# Patient Record
Sex: MALE | Race: BLACK OR AFRICAN AMERICAN | Employment: UNEMPLOYED | URBAN - METROPOLITAN AREA
[De-identification: names, ages, dates, MRNs, and addresses within clinical notes are randomized per-mention and may not be internally consistent; named-entity substitution may affect disease eponyms.]

---

## 2017-01-03 ENCOUNTER — CARE COORDINATION (OUTPATIENT)
Dept: GERIATRIC MEDICINE | Facility: CLINIC | Age: 77
End: 2017-01-03

## 2017-01-03 NOTE — PROGRESS NOTES
Home visit/Ward Risk Assessment/EW screening completed on: 01/03//17  Member resides: Apartment handicap accessible. Member continues to live in a one bedroom public housing apartment complex. Present at the visit were member and CM.  Apartment was clean and uncluttered. No recent hospitalization or ED visits. Had a recent cataract removal of the right eye. Last PCP visit on 12/1/16.  Membercontinues to need assistance with ADLs due to right arm and leg paralysis.    Member currently receiving the following services: PCA. Had decrease in PCAhours this year. Informed member of his right to appeal the reduction.   See EMR for a list of client's diagnoses and medications.   Medication management: Medications reviewed:Yes. Medication management: Independent-does not set up. Medication understanding:Patient has understanding of regimen and is adherent Yes. MTM offered.Yes. Declined.  Member Mood/behavior-PHQ9 score:  0. Denies feeling depressor or change in mood.   Plan of Care: No services recommended or requested.  Follow-Up Plan: Member informed of future contact, plan to f/u with member with a 6 month telephone assessment.  Contact information shared with member and family, encouraged member to call with any questions or concerns prior to this.  See [facility] for further detailed information  Rafiq Davila RN BSN N  [facility]   [phone]  Fax: [phone]

## 2017-01-05 ENCOUNTER — CARE COORDINATION (OUTPATIENT)
Dept: GERIATRIC MEDICINE | Facility: CLINIC | Age: 77
End: 2017-01-05

## 2017-01-05 NOTE — PROGRESS NOTES
Dr. Feliciano,    I am the [facility] case manager for this client, and I am writing to notify you of a change in services.   PCA services have been reduced from 6 hrs/day to 4 hrs/day.    This change has occurred because client no longer qualifies for 6 hrs/day based on the reported ADLs dependencies.  It was determined that 4 hrs/day is sufficient to  meet clients needs. Client is aware of his right to appeal the decision within 30 days.    I am required by the health plan to notify you of this change. No action is required on your part. Please do not hesitate to contact me with any questions or concerns. Thank you.    Rafiq Davila RN BSN PHN  [facility]   [phone]  Fax: [phone]

## 2017-01-06 ENCOUNTER — CARE COORDINATION (OUTPATIENT)
Dept: GERIATRIC MEDICINE | Facility: CLINIC | Age: 77
End: 2017-01-06

## 2017-01-06 NOTE — PROGRESS NOTES
Emailed completed PCA assessment to [facility].  Faxed copy of PCA assessment to [facility] and mailed copy to member.  Faxed MD Communication to PCP.   Helen Hernandez  Case Management Specialist  [facility]  [phone]

## 2017-01-21 ENCOUNTER — CARE COORDINATION (OUTPATIENT)
Dept: GERIATRIC MEDICINE | Facility: CLINIC | Age: 77
End: 2017-01-21

## 2017-01-21 NOTE — PROGRESS NOTES
Mailed copy of care plan to client.  As requested/needed:  emailed CPS to [employer] for [facility], updated services in access as needed and submitted appropriate referrals/auths, and entered MMIS. Chart was returned to LUIZA Hernandez  Case Management Specialist  [facility]  [phone]

## 2017-08-09 ENCOUNTER — CARE COORDINATION (OUTPATIENT)
Dept: GERIATRIC MEDICINE | Facility: CLINIC | Age: 77
End: 2017-08-09

## 2017-08-09 NOTE — PROGRESS NOTES
Called client for the 6 month telephone assessment, call went to . Left  requesting call back.    Rafiq Davila RN BSN N  [facility]   [phone]  Fax: [phone]

## 2017-08-10 NOTE — PROGRESS NOTES
Client returned call. See below.    [facility] Six-Month Telephone Assessment    6 month telephone assessment completed on 08/10/17    ER visits: No  Hospitalizations: No  TCU stays: No  Significant health status changes: No  Falls/Injuries: No  ADL/IADL changes: No  Changes in services: No    Caregiver Assessment follow up:  N/A. Has paid caregiver.     Goals: See POC in chart for goal progress documentation.     Will see client in 6 months for an annual health risk assessment.   Encouraged client to call CM with any questions or concerns in the meantime.     Rafiq Davila RN BSN PHN  [facility]   [phone]  Fax: [phone]

## 2017-08-11 ENCOUNTER — OFFICE VISIT (OUTPATIENT)
Dept: FAMILY MEDICINE | Facility: CLINIC | Age: 77
End: 2017-08-11
Payer: MEDICARE

## 2017-08-11 VITALS
SYSTOLIC BLOOD PRESSURE: 124 MMHG | HEIGHT: 67 IN | DIASTOLIC BLOOD PRESSURE: 60 MMHG | HEART RATE: 70 BPM | OXYGEN SATURATION: 97 % | BODY MASS INDEX: 22.29 KG/M2 | TEMPERATURE: 97.9 F | RESPIRATION RATE: 16 BRPM | WEIGHT: 142 LBS

## 2017-08-11 DIAGNOSIS — Z13.1 SCREENING FOR DIABETES MELLITUS: ICD-10-CM

## 2017-08-11 DIAGNOSIS — G83.20: ICD-10-CM

## 2017-08-11 DIAGNOSIS — R20.0 NUMBNESS IN FEET: ICD-10-CM

## 2017-08-11 DIAGNOSIS — I10 HYPERTENSION GOAL BP (BLOOD PRESSURE) < 140/90: Primary | ICD-10-CM

## 2017-08-11 DIAGNOSIS — K21.9 GASTROESOPHAGEAL REFLUX DISEASE WITHOUT ESOPHAGITIS: ICD-10-CM

## 2017-08-11 DIAGNOSIS — S06.9X0S TBI (TRAUMATIC BRAIN INJURY), WITHOUT LOC, SEQUELA: ICD-10-CM

## 2017-08-11 DIAGNOSIS — D21.9: ICD-10-CM

## 2017-08-11 DIAGNOSIS — G47.00 INSOMNIA, UNSPECIFIED TYPE: ICD-10-CM

## 2017-08-11 PROCEDURE — 99214 OFFICE O/P EST MOD 30 MIN: CPT | Performed by: FAMILY MEDICINE

## 2017-08-11 PROCEDURE — 82947 ASSAY GLUCOSE BLOOD QUANT: CPT | Performed by: FAMILY MEDICINE

## 2017-08-11 PROCEDURE — 36415 COLL VENOUS BLD VENIPUNCTURE: CPT | Performed by: FAMILY MEDICINE

## 2017-08-11 RX ORDER — TRAZODONE HYDROCHLORIDE 50 MG/1
100 TABLET, FILM COATED ORAL
Qty: 60 TABLET | Refills: 11 | Status: SHIPPED | OUTPATIENT
Start: 2017-08-11 | End: 2018-05-01

## 2017-08-11 NOTE — NURSING NOTE
"Chief Complaint   Patient presents with     Blood Draw     WANTS LAB TESTS       Initial /64  Pulse 70  Temp 97.9  F (36.6  C) (Tympanic)  Resp 16  Ht 5' 6.5\" (1.689 m)  Wt 142 lb (64.4 kg)  SpO2 97%  BMI 22.58 kg/m2 Estimated body mass index is 22.58 kg/(m^2) as calculated from the following:    Height as of this encounter: 5' 6.5\" (1.689 m).    Weight as of this encounter: 142 lb (64.4 kg).  Medication Reconciliation: complete   Caitlin Knapp CMA    "
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
## 2017-08-11 NOTE — LETTER
Lazaro Paulson  [address]   [facility] [address]        August 14, 2017          Dear ,    We are writing to inform you of your test results.    NORMAL GLUCOSE TEST    Resulted Orders   Glucose   Result Value Ref Range    Glucose 90 70 - 99 mg/dL       If you have any questions or concerns, please call the clinic at the number listed above.       Sincerely,        LALI HART MD
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
## 2017-08-12 LAB — GLUCOSE SERPL-MCNC: 90 MG/DL (ref 70–99)

## 2017-08-18 DIAGNOSIS — I10 ESSENTIAL HYPERTENSION WITH GOAL BLOOD PRESSURE LESS THAN 140/90: ICD-10-CM

## 2017-08-18 DIAGNOSIS — K21.9 GASTROESOPHAGEAL REFLUX DISEASE WITHOUT ESOPHAGITIS: ICD-10-CM

## 2017-08-18 NOTE — TELEPHONE ENCOUNTER
Hydrochlorothiazide  - Discontinued  Last Written Prescription Date: 8/25/15  Last Fill Quantity: 90, # refills: 3  Last Office Visit with [facility], [facility] or [facility] prescribing provider: 8/11/17       Potassium   Date Value Ref Range Status   12/01/2016 3.9 3.4 - 5.3 mmol/L Final     Creatinine   Date Value Ref Range Status   12/01/2016 1.24 0.66 - 1.25 mg/dL Final     BP Readings from Last 3 Encounters:   08/11/17 124/60   12/01/16 148/78   05/31/16 160/78     Aspir-Low      Last Written Prescription Date: 5/23/16  Last Fill Quantity: 100,  # refills: 3   Last Office Visit with [facility], [facility] or [facility] prescribing provider: 8/11/17      Omeprazole      Last Written Prescription Date: 5/23/16  Last Fill Quantity: 30,  # refills: 12   Last Office Visit with [facility], [facility] or [facility] prescribing provider: 8/11/17

## 2017-08-21 RX ORDER — NICOTINE POLACRILEX 4 MG/1
20 GUM, CHEWING ORAL DAILY
Qty: 90 TABLET | Refills: 3 | Status: SHIPPED | OUTPATIENT
Start: 2017-08-21 | End: 2018-05-01

## 2017-08-21 RX ORDER — HYDROCHLOROTHIAZIDE 12.5 MG/1
12.5 TABLET ORAL DAILY
Qty: 30 TABLET | Refills: 5 | Status: SHIPPED | OUTPATIENT
Start: 2017-08-21 | End: 2018-02-18

## 2017-08-21 NOTE — TELEPHONE ENCOUNTER
Routing refill request to provider for review/approval because:  Note in med hx that patient stopped the HCTZ

## 2017-08-23 DIAGNOSIS — I10 ESSENTIAL HYPERTENSION WITH GOAL BLOOD PRESSURE LESS THAN 140/90: ICD-10-CM

## 2017-08-23 RX ORDER — ATENOLOL 50 MG/1
50 TABLET ORAL DAILY
Qty: 30 TABLET | Refills: 11 | Status: SHIPPED | OUTPATIENT
Start: 2017-08-23 | End: 2018-05-01

## 2017-08-23 NOTE — TELEPHONE ENCOUNTER
atenolol      Last Written Prescription Date: 5-23-16  Last Fill Quantity: 30, # refills: 11    Last Office Visit with [facility], [facility] or [facility] prescribing provider:  8-11-17   Future Office Visit:        BP Readings from Last 3 Encounters:   08/11/17 124/60   12/01/16 148/78   05/31/16 160/78     Prescription approved per [facility] Refill Protocol.

## 2017-09-13 ENCOUNTER — OFFICE VISIT (OUTPATIENT)
Dept: FAMILY MEDICINE | Facility: CLINIC | Age: 77
End: 2017-09-13
Payer: MEDICARE

## 2017-09-13 VITALS
HEART RATE: 78 BPM | BODY MASS INDEX: 22.81 KG/M2 | WEIGHT: 143.5 LBS | TEMPERATURE: 98.6 F | DIASTOLIC BLOOD PRESSURE: 72 MMHG | SYSTOLIC BLOOD PRESSURE: 140 MMHG | OXYGEN SATURATION: 98 % | RESPIRATION RATE: 20 BRPM

## 2017-09-13 DIAGNOSIS — G83.20: ICD-10-CM

## 2017-09-13 DIAGNOSIS — M54.50 CHRONIC MIDLINE LOW BACK PAIN WITHOUT SCIATICA: ICD-10-CM

## 2017-09-13 DIAGNOSIS — R20.9 DISTURBANCE OF SKIN SENSATION: Primary | ICD-10-CM

## 2017-09-13 DIAGNOSIS — G89.29 CHRONIC MIDLINE LOW BACK PAIN WITHOUT SCIATICA: ICD-10-CM

## 2017-09-13 LAB
CRP SERPL-MCNC: 3.5 MG/L (ref 0–8)
ERYTHROCYTE [SEDIMENTATION RATE] IN BLOOD BY WESTERGREN METHOD: 7 MM/H (ref 0–20)
FOLATE SERPL-MCNC: 12.7 NG/ML
VIT B12 SERPL-MCNC: 495 PG/ML (ref 193–986)

## 2017-09-13 PROCEDURE — 99214 OFFICE O/P EST MOD 30 MIN: CPT | Performed by: FAMILY MEDICINE

## 2017-09-13 PROCEDURE — 84443 ASSAY THYROID STIM HORMONE: CPT | Performed by: FAMILY MEDICINE

## 2017-09-13 PROCEDURE — 82746 ASSAY OF FOLIC ACID SERUM: CPT | Performed by: FAMILY MEDICINE

## 2017-09-13 PROCEDURE — 82607 VITAMIN B-12: CPT | Performed by: FAMILY MEDICINE

## 2017-09-13 PROCEDURE — 80053 COMPREHEN METABOLIC PANEL: CPT | Performed by: FAMILY MEDICINE

## 2017-09-13 PROCEDURE — 85652 RBC SED RATE AUTOMATED: CPT | Performed by: FAMILY MEDICINE

## 2017-09-13 PROCEDURE — 86140 C-REACTIVE PROTEIN: CPT | Performed by: FAMILY MEDICINE

## 2017-09-13 PROCEDURE — 36415 COLL VENOUS BLD VENIPUNCTURE: CPT | Performed by: FAMILY MEDICINE

## 2017-09-13 NOTE — MR AVS SNAPSHOT
After Visit Summary   9/13/2017    Lazaro Paulson    MRN: [id number]           Patient Information     Date Of Birth          1940        Visit Information        Provider Department      9/13/2017 2:30 PM Nacho Peres MD; [employer] [facility]        Today's Diagnoses     Disturbance of skin sensation    -  1    Chronic midline low back pain without sciatica          Care Instructions    Continue present medications          Follow-ups after your visit        Your next 10 appointments already scheduled     Sep 19, 2017  3:45 PM CDT   [facility]    [address]  [address]  [place] [address] [phone]           Bring a current list of meds and any records pertaining to this visit. For Physicals, please bring immunization records and any forms needing to be filled out. Please arrive 10 minutes early to complete paperwork.              Who to contact     If you have questions or need follow up information about today's clinic visit or your schedule please contact [facility] directly at [phone].  Normal or non-critical lab and imaging results will be communicated to you by [employer], letter or phone within 4 business days after the clinic has received the results. If you do not hear from us within 7 days, please contact the clinic through [employer] or phone. If you have a critical or abnormal lab result, we will notify you by phone as soon as possible.  Submit refill requests through [employer] or call your pharmacy and they will forward the refill request to us. Please allow 3 business days for your refill to be completed.          Additional Information About Your Visit        [employer] Information     [employer] lets you send messages to your doctor, view your test  "results, renew your prescriptions, schedule appointments and more. To sign up, go to [email or web address] . Click on \"Log in\" on the left side of the screen, which will take you to the Welcome page. Then click on \"Sign up Now\" on the right side of the page.     You will be asked to enter the access code listed below, as well as some personal information. Please follow the directions to create your username and password.     Your access code is: [email or web address]  Expires: 2017  2:42 PM     Your access code will  in 90 days. If you need help or a new code, please call your [place] clinic or [phone].        Care EveryWhere ID     This is your Care EveryWhere ID. This could be used by other organizations to access your [place] medical records  [id number]        Your Vitals Were     Pulse Temperature Respirations Pulse Oximetry BMI (Body Mass Index)       78 98.6  F (37  C) (Skin) 20 98% 22.81 kg/m2        Blood Pressure from Last 3 Encounters:   17 140/72   17 124/60   16 148/78    Weight from Last 3 Encounters:   17 143 lb 8 oz (65.1 kg)   17 142 lb (64.4 kg)   16 149 lb 12.8 oz (67.9 kg)              We Performed the Following     Comprehensive metabolic panel (BMP + Alb, Alk Phos, ALT, AST, Total. Bili, TP)     CRP, inflammation     ESR: Erythrocyte sedimentation rate     Folate     TSH with free T4 reflex     Vitamin B12        Primary Care Provider Office Phone # Fax #    Asif Nelli Feliciano [phone] [address] [facility] [address]        Equal Access to Services     ARABELLA SOSA : Alia Stafford, geetha castellanos, brittaine walton, anjelica mcmillan. So [facility] [phone].    ATENCIÓN: Si habla español, tiene a barrios disposición servicios gratuitos de asistencia lingüística. Llame al [phone].    We comply with applicable federal civil rights laws and [place] laws. We do not " discriminate on the basis of race, color, national origin, age, disability sex, sexual orientation or gender identity.            Thank you!     Thank you for choosing [facility]  for your care. Our goal is always to provide you with excellent care. Hearing back from our patients is one way we can continue to improve our services. Please take a few minutes to complete the written survey that you may receive in the mail after your visit with us. Thank you!             Your Updated Medication List - Protect others around you: Learn how to safely use, store and throw away your medicines at www.disposemymeds.org.          This list is accurate as of: 9/13/17  3:06 PM.  Always use your most recent med list.                   Brand Name Dispense Instructions for use Diagnosis    aspirin 81 MG tablet     100 tablet    Take 1 tablet (81 mg) by mouth daily as needed    Essential hypertension with goal blood pressure less than 140/90       atenolol 50 MG tablet    TENORMIN    30 tablet    Take 1 tablet (50 mg) by mouth daily    Essential hypertension with goal blood pressure less than 140/90       cholecalciferol 1000 UNIT tablet    vitamin D    150 tablet    Take 2 tablets (2,000 Units) by mouth daily    Vitamin D deficiency disease       hydrochlorothiazide 12.5 MG Tabs tablet     30 tablet    Take 1 tablet (12.5 mg) by mouth daily    Essential hypertension with goal blood pressure less than 140/90       omeprazole 20 MG tablet     90 tablet    Take 1 tablet (20 mg) by mouth daily    Gastroesophageal reflux disease without esophagitis       order for DME     1 each    Equipment being ordered: smygmomanometer    Hypertension goal BP (blood pressure) < 140/90       traZODone 50 MG tablet    DESYREL    60 tablet    Take 2 tablets (100 mg) by mouth nightly as needed for sleep    Insomnia, unspecified type
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
## 2017-09-13 NOTE — LETTER
September 15, 2017      Lazaro Paulson  [address]   [place] [facility] [address]        Dear ,    We are writing to inform you of your test results.    Thyroid test (TSH) is normal  Metabolic panel is normal  Vitamin B12 and Folate levels are normal  CRP inflammation test is normal, no evidence for inflammation    All of the lab tests are normal    Resulted Orders   Comprehensive metabolic panel (BMP + Alb, Alk Phos, ALT, AST, Total. Bili, TP)   Result Value Ref Range    Sodium 140 133 - 144 mmol/L    Potassium 3.6 3.4 - 5.3 mmol/L    Chloride 108 94 - 109 mmol/L    Carbon Dioxide 26 20 - 32 mmol/L    Anion Gap 6 3 - 14 mmol/L    Glucose 89 70 - 99 mg/dL    Urea Nitrogen 9 7 - 30 mg/dL    Creatinine 0.93 0.66 - 1.25 mg/dL    GFR Estimate 79 >60 mL/min/1.7m2      Comment:      Non  GFR Calc    GFR Estimate If Black >90 >60 mL/min/1.7m2      Comment:       GFR Calc    Calcium 8.8 8.5 - 10.1 mg/dL    Bilirubin Total 0.4 0.2 - 1.3 mg/dL    Albumin 3.7 3.4 - 5.0 g/dL    Protein Total 7.0 6.8 - 8.8 g/dL    Alkaline Phosphatase 76 40 - 150 U/L    ALT 24 0 - 70 U/L    AST 18 0 - 45 U/L   ESR: Erythrocyte sedimentation rate   Result Value Ref Range    Sed Rate 7 0 - 20 mm/h   CRP, inflammation   Result Value Ref Range    CRP Inflammation 3.5 0.0 - 8.0 mg/L   Vitamin B12   Result Value Ref Range    Vitamin B12 495 193 - 986 pg/mL   Folate   Result Value Ref Range    Folate 12.7 >5.4 ng/mL   TSH with free T4 reflex   Result Value Ref Range    TSH 1.35 0.40 - 4.00 mU/L       If you have any questions or concerns, please call the clinic at the number listed above.       Sincerely,        Nacho Peres MD

## 2017-09-13 NOTE — NURSING NOTE
"Chief Complaint   Patient presents with     Musculoskeletal Problem       Initial /72 (BP Location: Left arm, Patient Position: Chair, Cuff Size: Adult Regular)  Pulse 78  Temp 98.6  F (37  C) (Skin)  Resp 20  Wt 143 lb 8 oz (65.1 kg)  SpO2 98%  BMI 22.81 kg/m2 Estimated body mass index is 22.81 kg/(m^2) as calculated from the following:    Height as of 8/11/17: 5' 6.5\" (1.689 m).    Weight as of this encounter: 143 lb 8 oz (65.1 kg).  Medication Reconciliation: complete     Princess KATIE Shepherd CMA      "

## 2017-09-14 LAB
ALBUMIN SERPL-MCNC: 3.7 G/DL (ref 3.4–5)
ALP SERPL-CCNC: 76 U/L (ref 40–150)
ALT SERPL W P-5'-P-CCNC: 24 U/L (ref 0–70)
ANION GAP SERPL CALCULATED.3IONS-SCNC: 6 MMOL/L (ref 3–14)
AST SERPL W P-5'-P-CCNC: 18 U/L (ref 0–45)
BILIRUB SERPL-MCNC: 0.4 MG/DL (ref 0.2–1.3)
BUN SERPL-MCNC: 9 MG/DL (ref 7–30)
CALCIUM SERPL-MCNC: 8.8 MG/DL (ref 8.5–10.1)
CHLORIDE SERPL-SCNC: 108 MMOL/L (ref 94–109)
CO2 SERPL-SCNC: 26 MMOL/L (ref 20–32)
CREAT SERPL-MCNC: 0.93 MG/DL (ref 0.66–1.25)
GFR SERPL CREATININE-BSD FRML MDRD: 79 ML/MIN/1.7M2
GLUCOSE SERPL-MCNC: 89 MG/DL (ref 70–99)
POTASSIUM SERPL-SCNC: 3.6 MMOL/L (ref 3.4–5.3)
PROT SERPL-MCNC: 7 G/DL (ref 6.8–8.8)
SODIUM SERPL-SCNC: 140 MMOL/L (ref 133–144)
TSH SERPL DL<=0.005 MIU/L-ACNC: 1.35 MU/L (ref 0.4–4)

## 2017-09-19 ENCOUNTER — OFFICE VISIT (OUTPATIENT)
Dept: FAMILY MEDICINE | Facility: CLINIC | Age: 77
End: 2017-09-19
Payer: MEDICARE

## 2017-09-19 VITALS
TEMPERATURE: 98.1 F | OXYGEN SATURATION: 99 % | SYSTOLIC BLOOD PRESSURE: 148 MMHG | BODY MASS INDEX: 23.21 KG/M2 | DIASTOLIC BLOOD PRESSURE: 84 MMHG | RESPIRATION RATE: 16 BRPM | WEIGHT: 146 LBS | HEART RATE: 82 BPM

## 2017-09-19 DIAGNOSIS — G62.9 PERIPHERAL POLYNEUROPATHY: Primary | ICD-10-CM

## 2017-09-19 PROCEDURE — 99213 OFFICE O/P EST LOW 20 MIN: CPT | Performed by: FAMILY MEDICINE

## 2017-09-19 RX ORDER — GABAPENTIN 100 MG/1
100 CAPSULE ORAL 3 TIMES DAILY
Qty: 60 CAPSULE | Refills: 3 | Status: SHIPPED | OUTPATIENT
Start: 2017-09-19 | End: 2018-05-01
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
## 2017-09-19 NOTE — NURSING NOTE
"Chief Complaint   Patient presents with     Lab Result Notice       Initial /84  Pulse 82  Temp 98.1  F (36.7  C) (Tympanic)  Resp 16  Wt 146 lb (66.2 kg)  SpO2 99%  BMI 23.21 kg/m2 Estimated body mass index is 23.21 kg/(m^2) as calculated from the following:    Height as of 8/11/17: 5' 6.5\" (1.689 m).    Weight as of this encounter: 146 lb (66.2 kg).  Medication Reconciliation: complete   Caitlin Knapp CMA      "
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
## 2017-12-20 ENCOUNTER — CARE COORDINATION (OUTPATIENT)
Dept: GERIATRIC MEDICINE | Facility: CLINIC | Age: 77
End: 2017-12-20

## 2017-12-20 NOTE — PROGRESS NOTES
Called client to schedule annual home visit but was unable to reach him. Left vm requesting to return call.    Rafiq Davila RN BSN N  [facility]   [phone]  Fax: [phone]

## 2017-12-28 ENCOUNTER — CARE COORDINATION (OUTPATIENT)
Dept: GERIATRIC MEDICINE | Facility: CLINIC | Age: 77
End: 2017-12-28

## 2017-12-28 ASSESSMENT — PATIENT HEALTH QUESTIONNAIRE - PHQ9: SUM OF ALL RESPONSES TO PHQ QUESTIONS 1-9: 8
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
## 2018-01-02 ENCOUNTER — CARE COORDINATION (OUTPATIENT)
Dept: GERIATRIC MEDICINE | Facility: CLINIC | Age: 78
End: 2018-01-02

## 2018-01-02 NOTE — PROGRESS NOTES
[facility]:  Emailed completed PCA assessment to [facility].  Faxed copy of PCA assessment to PCA Agency and mailed copy to member.  Faxed MD Communication to PCP.   Helen Hernandez  Case Management Specialist  [facility]  [phone]

## 2018-01-03 DIAGNOSIS — E55.9 VITAMIN D DEFICIENCY DISEASE: ICD-10-CM

## 2018-01-03 NOTE — TELEPHONE ENCOUNTER
Reason for Call:  Medication or medication refill:    Do you use a [facility]?  Name of the pharmacy and phone number for the current request:  [facility] pharmacy    Name of the medication requested: cholecalciferol (VITAMIN D) 1000 UNIT tablet     Vitamin D3    Other request:  Please order    Can we leave a detailed message on this number? YES    Phone number patient can be reached at: Home number on file [phone] (home)    Best Time:  anytime    Call taken on 1/3/2018 at 3:56 PM by LEFTY MEDINA

## 2018-01-08 NOTE — PROGRESS NOTES
Rec'd email from [facility] stating PCA agency changed and requested agency information on PCA assessment be updated to reflect the change.  Change was made and assessment was sent back to [facility].  CC notified.  Helen Hernandez  Case Management Specialist  [facility]  [phone]

## 2018-01-08 NOTE — TELEPHONE ENCOUNTER
"Requested Prescriptions   Pending Prescriptions Disp Refills     cholecalciferol (VITAMIN D3) 1000 UNIT tablet  Last Written Prescription Date:  5/23/2016  Last Fill Quantity: 150 tablet,  # refills: 3   Last Office Visit with G, P or [facility] prescribing provider:  9/19/2017   Future Office Visit:    150 tablet 3     Sig: Take 2 tablets (2,000 Units) by mouth daily    Vitamin Supplements (Adult) Protocol Passed    1/3/2018  3:57 PM       Passed - High dose Vitamin D not ordered       Passed - Recent or future visit with authorizing provider's specialty    Patient had office visit in the last year or has a visit in the next 30 days with authorizing provider.  See \"Choose Columns\" in Meds & Orders section of the refill encounter.                 "

## 2018-01-10 ENCOUNTER — CARE COORDINATION (OUTPATIENT)
Dept: GERIATRIC MEDICINE | Facility: CLINIC | Age: 78
End: 2018-01-10

## 2018-01-10 NOTE — PROGRESS NOTES
Received after visit chart from care coordinator.  Completed following tasks: Mailed copy of care plan to client and Entered MMIS  Chart was returned to CC.   Helen Hernandez  Case Management Specialist  [facility]  [phone]

## 2018-02-18 DIAGNOSIS — I10 ESSENTIAL HYPERTENSION WITH GOAL BLOOD PRESSURE LESS THAN 140/90: ICD-10-CM

## 2018-02-19 RX ORDER — HYDROCHLOROTHIAZIDE 12.5 MG/1
TABLET ORAL
Qty: 30 TABLET | Refills: 0 | Status: SHIPPED | OUTPATIENT
Start: 2018-02-19 | End: 2018-03-20

## 2018-02-19 NOTE — TELEPHONE ENCOUNTER
Medication is being filled for 1 time refill only due to:  Patient need follow up appt for more refills.

## 2018-02-19 NOTE — TELEPHONE ENCOUNTER
"Requested Prescriptions   Pending Prescriptions Disp Refills     hydrochlorothiazide 12.5 MG TABS tablet [Pharmacy Med Name: HYDROCHLOROTHIAZIDE 12.5 MG TB] 30 tablet 0      Last Written Prescription Date:  8/21/17  Last Fill Quantity: 30,  # refills: 5   Last office visit: 9/19/2017 with prescribing provider:  Dr Asif Feliciano   Future Office Visit:     Sig: TAKE 1 TABLET BY MOUTH EVERY DAY    Diuretics (Including Combos) Protocol Passed    2/18/2018  5:47 AM       Passed - Blood pressure under 140/90 in past 12 months    BP Readings from Last 3 Encounters:   09/19/17 148/84   09/13/17 140/72   08/11/17 124/60                Passed - Recent or future visit with authorizing provider's specialty    Patient had office visit in the last year or has a visit in the next 30 days with authorizing provider.  See \"Patient Info\" tab in inbasket, or \"Choose Columns\" in Meds & Orders section of the refill encounter.            Passed - Patient is age 18 or older       Passed - Normal serum creatinine on file in past 12 months    Recent Labs   Lab Test  09/13/17   1508   CR  0.93             Passed - Normal serum potassium on file in past 12 months    Recent Labs   Lab Test  09/13/17   1508   POTASSIUM  3.6                   Passed - Normal serum sodium on file in past 12 months    Recent Labs   Lab Test  09/13/17   1508   NA  140                "

## 2018-03-20 DIAGNOSIS — I10 ESSENTIAL HYPERTENSION WITH GOAL BLOOD PRESSURE LESS THAN 140/90: ICD-10-CM

## 2018-03-20 NOTE — TELEPHONE ENCOUNTER
"Requested Prescriptions   Pending Prescriptions Disp Refills     hydrochlorothiazide 12.5 MG TABS tablet [Pharmacy Med Name: HYDROCHLOROTHIAZIDE 12.5 MG TB]  PATIENT NEEDS FOLLOW UP APPT.  Last Written Prescription Date:  2/19/18  Last Fill Quantity: 30 TABLET,  # refills: 0   Last office visit: 9/19/2017 with prescribing provider:  TOMY HART   Future Office Visit:     30 tablet 0     Sig: TAKE 1 TABLET BY MOUTH EVERY DAY    Diuretics (Including Combos) Protocol Failed    3/20/2018  1:52 AM       Failed - Blood pressure under 140/90 in past 12 months    BP Readings from Last 3 Encounters:   09/19/17 148/84   09/13/17 140/72   08/11/17 124/60                Passed - Recent (12 mo) or future (30 days) visit within the authorizing provider's specialty    Patient had office visit in the last 12 months or has a visit in the next 30 days with authorizing provider or within the authorizing provider's specialty.  See \"Patient Info\" tab in inbasket, or \"Choose Columns\" in Meds & Orders section of the refill encounter.           Passed - Patient is age 18 or older       Passed - Normal serum creatinine on file in past 12 months    Recent Labs   Lab Test  09/13/17   1508   CR  0.93             Passed - Normal serum potassium on file in past 12 months    Recent Labs   Lab Test  09/13/17   1508   POTASSIUM  3.6                   Passed - Normal serum sodium on file in past 12 months    Recent Labs   Lab Test  09/13/17   1508   NA  140                "

## 2018-03-21 NOTE — TELEPHONE ENCOUNTER
Routing refill request to provider for review/approval because:  Patient was to have had f/u in Oct 2017 re BP.  He has been given danie refill

## 2018-03-22 RX ORDER — HYDROCHLOROTHIAZIDE 12.5 MG/1
TABLET ORAL
Qty: 30 TABLET | Refills: 0 | Status: SHIPPED | OUTPATIENT
Start: 2018-03-22 | End: 2018-05-01

## 2018-04-28 DIAGNOSIS — I10 ESSENTIAL HYPERTENSION WITH GOAL BLOOD PRESSURE LESS THAN 140/90: ICD-10-CM

## 2018-04-30 NOTE — TELEPHONE ENCOUNTER
Routing refill request to provider for review/approval because:  Ghazal given x1 and patient did not follow up, please advise  Patient needs to be seen because it has been more than 1 year since last office visit.

## 2018-04-30 NOTE — TELEPHONE ENCOUNTER
"Requested Prescriptions   Pending Prescriptions Disp Refills     hydrochlorothiazide 12.5 MG TABS tablet [Pharmacy Med Name: HYDROCHLOROTHIAZIDE 12.5 MG TB]  Last Written Prescription Date:  3/22/18  Last Fill Quantity: 30,  # refills: 0   Last office visit: 9/19/2017 with prescribing provider:  Braden   Future Office Visit:   Next 5 appointments (look out 90 days)     May 01, 2018  2:30 PM CDT   Office Visit with Asif Feliciano MD, [employer]   [facility]    [address] [place] [id number] [phone]                  30 tablet 0     Sig: TAKE 1 TABLET BY MOUTH EVERY DAY    Diuretics (Including Combos) Protocol Failed    4/28/2018  4:50 PM       Failed - Blood pressure under 140/90 in past 12 months    BP Readings from Last 3 Encounters:   09/19/17 148/84   09/13/17 140/72   08/11/17 124/60                Passed - Recent (12 mo) or future (30 days) visit within the authorizing provider's specialty    Patient had office visit in the last 12 months or has a visit in the next 30 days with authorizing provider or within the authorizing provider's specialty.  See \"Patient Info\" tab in inbasket, or \"Choose Columns\" in Meds & Orders section of the refill encounter.           Passed - Patient is age 18 or older       Passed - Normal serum creatinine on file in past 12 months    Recent Labs   Lab Test  09/13/17   1508   CR  0.93             Passed - Normal serum potassium on file in past 12 months    Recent Labs   Lab Test  09/13/17   1508   POTASSIUM  3.6                   Passed - Normal serum sodium on file in past 12 months    Recent Labs   Lab Test  09/13/17   1508   NA  140                "

## 2018-05-01 ENCOUNTER — OFFICE VISIT (OUTPATIENT)
Dept: FAMILY MEDICINE | Facility: CLINIC | Age: 78
End: 2018-05-01
Payer: MEDICARE

## 2018-05-01 VITALS
RESPIRATION RATE: 16 BRPM | HEART RATE: 75 BPM | DIASTOLIC BLOOD PRESSURE: 64 MMHG | WEIGHT: 155 LBS | BODY MASS INDEX: 24.64 KG/M2 | SYSTOLIC BLOOD PRESSURE: 144 MMHG | TEMPERATURE: 97.6 F | OXYGEN SATURATION: 97 %

## 2018-05-01 DIAGNOSIS — E55.9 VITAMIN D DEFICIENCY DISEASE: ICD-10-CM

## 2018-05-01 DIAGNOSIS — G47.00 INSOMNIA, UNSPECIFIED TYPE: ICD-10-CM

## 2018-05-01 DIAGNOSIS — I10 ESSENTIAL HYPERTENSION WITH GOAL BLOOD PRESSURE LESS THAN 140/90: ICD-10-CM

## 2018-05-01 DIAGNOSIS — K21.9 GASTROESOPHAGEAL REFLUX DISEASE WITHOUT ESOPHAGITIS: ICD-10-CM

## 2018-05-01 DIAGNOSIS — G62.9 PERIPHERAL POLYNEUROPATHY: ICD-10-CM

## 2018-05-01 PROCEDURE — 99214 OFFICE O/P EST MOD 30 MIN: CPT | Performed by: FAMILY MEDICINE

## 2018-05-01 RX ORDER — QUETIAPINE FUMARATE 25 MG/1
25 TABLET, FILM COATED ORAL
Qty: 30 TABLET | Refills: 1 | Status: SHIPPED | OUTPATIENT
Start: 2018-05-01 | End: 2018-06-19

## 2018-05-01 RX ORDER — NICOTINE POLACRILEX 4 MG/1
20 GUM, CHEWING ORAL DAILY
Qty: 90 TABLET | Refills: 3 | Status: SHIPPED | OUTPATIENT
Start: 2018-05-01 | End: 2018-08-10

## 2018-05-01 RX ORDER — GABAPENTIN 100 MG/1
100 CAPSULE ORAL 3 TIMES DAILY
Qty: 60 CAPSULE | Refills: 3 | Status: SHIPPED | OUTPATIENT
Start: 2018-05-01 | End: 2018-08-10

## 2018-05-01 RX ORDER — HYDROCHLOROTHIAZIDE 12.5 MG/1
12.5 TABLET ORAL DAILY
Qty: 90 TABLET | Refills: 3 | Status: SHIPPED | OUTPATIENT
Start: 2018-05-01 | End: 2018-08-10

## 2018-05-01 RX ORDER — LANOLIN ALCOHOL/MO/W.PET/CERES
3 CREAM (GRAM) TOPICAL
Qty: 60 TABLET | Refills: 11 | Status: SHIPPED | OUTPATIENT
Start: 2018-05-01 | End: 2018-08-10

## 2018-05-01 RX ORDER — HYDROCHLOROTHIAZIDE 12.5 MG/1
12.5 TABLET ORAL DAILY
Qty: 30 TABLET | Refills: 0 | Status: SHIPPED | OUTPATIENT
Start: 2018-05-01 | End: 2018-05-01

## 2018-05-01 RX ORDER — ATENOLOL 50 MG/1
50 TABLET ORAL DAILY
Qty: 30 TABLET | Refills: 11 | Status: SHIPPED | OUTPATIENT
Start: 2018-05-01 | End: 2018-08-10

## 2018-05-01 RX ORDER — HYDROCHLOROTHIAZIDE 12.5 MG/1
TABLET ORAL
Qty: 30 TABLET | Refills: 0 | Status: SHIPPED | OUTPATIENT
Start: 2018-05-01 | End: 2018-08-10

## 2018-05-01 NOTE — MR AVS SNAPSHOT
"              After Visit Summary   5/1/2018    Lazaro Paulson    MRN: [id number]           Patient Information     Date Of Birth          1940        Visit Information        Provider Department      5/1/2018 2:30 PM Asif Feliciano MD; [employer] [facility]        Today's Diagnoses     Insomnia, unspecified type        Gastroesophageal reflux disease without esophagitis        Essential hypertension with goal blood pressure less than 140/90        Peripheral polyneuropathy        Vitamin D deficiency disease          Care Instructions       SLEEP HYGEINE    Fix a bedtime and an awakening time. Do not be one of .    Avoid napping during the day. \    Avoid alcohol 4-6 hours before bedtime.     Avoid caffeine 4-6 hours before bedtime.     Avoid heavy, spicy, or sugary foods 4-6 hours before bedtime.    Exercise regularly, but not right before bed. \     .   Your Sleeping Environment    Use comfortable bedding.     Find a comfortable temperature setting for sleeping and keep the room well ventilated.\    Block out all distracting noise, Reserve the bed for sleep and sex.   Getting Ready For Bed    Try a light snack before bed. Warm milk and foods high in the amino acid tryptophan, such as bananas, may help you to sleep.    Practice relaxation techniques before bed. 888 breather.    Breathe in for 8 hold it for 8 and breathe out for 8  Think of something pleasant    Don't take your worries to bed.\    Establish a pre-sleep ritual. \    p Get into your favorite sleeping position. \  Getting Up in the Middle of the Night  Most people wake up one or two times a night for various reasons. If you find that you get up in the middle of night and cannot get back to sleep within 15-20 minutes, then do not remain in the bed \"trying hard\" to sleep. Get out of bed. Leave the bedroom. Read, have a light snack, do some quiet activity, or take a bath. You will " generally find that you can get back to sleep 20 minutes or so later. Do not perform challenging or engaging activity such as office work, housework, etc. Do not watch television.  A Word About Television  Avoid in bedroom while sleeping.   Other Factors    Several physical factors are known to upset sleep. T\    Psychological and mental health problems like depression, anxiety and stress are often associated with sleeping difficulty.     Many medications can cause sleeplessness as a side effect.     To help overall improvement in sleep patterns, your doctor may prescribe sleep medications for short-term relief of a sleep problem.     Always follow the advice of your physician and other healthcare professionals.        COUNT YOUR BLESSINGS AT LEAST 5 PER NIGHT MEDITATE ON THEM PRIOR TO BEDTIME    '    '(G47.00) Insomnia, unspecified type    Comment:      Plan: melatonin 3 MG tablet, QUEtiapine (SEROQUEL) 25          MG tablet                   (K21.9) Gastroesophageal reflux disease without esophagitis    Comment:      Plan: omeprazole 20 MG tablet                   (I10) Essential hypertension with goal blood pressure less than 140/90    Comment:      Plan: atenolol (TENORMIN) 50 MG tablet, aspirin 81 MG          tablet, hydrochlorothiazide 12.5 MG TABS           tablet, DISCONTINUED: hydrochlorothiazide 12.5           MG TABS tablet                   (G62.9) Peripheral polyneuropathy    Comment:      Plan: gabapentin (NEURONTIN) 100 MG capsule                   (E55.9) Vitamin D deficiency disease    Comment:      Plan: cholecalciferol (VITAMIN D3) 1000 UNIT tablet                                           Follow-ups after your visit        Who to contact     If you have questions or need follow up information about today's clinic visit or your schedule please contact [facility] directly at [phone].  Normal or non-critical lab and imaging results will be communicated to you  "by [employer], letter or phone within 4 business days after the clinic has received the results. If you do not hear from us within 7 days, please contact the clinic through [employer] or phone. If you have a critical or abnormal lab result, we will notify you by phone as soon as possible.  Submit refill requests through [employer] or call your pharmacy and they will forward the refill request to us. Please allow 3 business days for your refill to be completed.          Additional Information About Your Visit        [facility] Information     [employer] lets you send messages to your doctor, view your test results, renew your prescriptions, schedule appointments and more. To sign up, go to [email or web address] . Click on \"Log in\" on the left side of the screen, which will take you to the Welcome page. Then click on \"Sign up Now\" on the right side of the page.     You will be asked to enter the access code listed below, as well as some personal information. Please follow the directions to create your username and password.     Your access code is: [email or web address]  Expires: 2018  3:47 PM     Your access code will  in 90 days. If you need help or a new code, please call your [place] clinic or [phone].        Care EveryWhere ID     This is your Care EveryWhere ID. This could be used by other organizations to access your [place] medical records  [id number]        Your Vitals Were     Pulse Temperature Respirations Pulse Oximetry BMI (Body Mass Index)       75 97.6  F (36.4  C) (Tympanic) 16 97% 24.64 kg/m2        Blood Pressure from Last 3 Encounters:   18 144/64   17 148/84   17 140/72    Weight from Last 3 Encounters:   18 155 lb (70.3 kg)   17 146 lb (66.2 kg)   17 143 lb 8 oz (65.1 kg)              Today, you had the following     No orders found for display         Today's Medication Changes          These changes are accurate as of 18  3:47 PM.  If you have any questions, " ask your nurse or doctor.               Start taking these medicines.        Dose/Directions    hydrochlorothiazide 12.5 MG Tabs tablet   Used for:  Essential hypertension with goal blood pressure less than 140/90   Started by:  Asif Feliciano MD        Dose:  12.5 mg   Take 1 tablet (12.5 mg) by mouth daily   Quantity:  90 tablet   Refills:  3       melatonin 3 MG tablet   Used for:  Insomnia, unspecified type   Started by:  Asif Feliciano MD        Dose:  3 mg   Take 1 tablet (3 mg) by mouth nightly as needed for sleep   Quantity:  60 tablet   Refills:  11       QUEtiapine 25 MG tablet   Commonly known as:  SEROQUEL   Used for:  Insomnia, unspecified type   Started by:  Asif Feliciano MD        Dose:  25 mg   Take 1 tablet (25 mg) by mouth nightly as needed   Quantity:  30 tablet   Refills:  1         Stop taking these medicines if you haven't already. Please contact your care team if you have questions.     traZODone 50 MG tablet   Commonly known as:  DESYREL   Stopped by:  Asif Feliciano MD                Where to get your medicines      These medications were sent to [facility] [id number] [place] [facility] [address] [facility]  [address] [id number]     Phone:  [phone]     aspirin 81 MG tablet    atenolol 50 MG tablet    cholecalciferol 1000 UNIT tablet    gabapentin 100 MG capsule    melatonin 3 MG tablet    omeprazole 20 MG tablet    QUEtiapine 25 MG tablet         Some of these will need a paper prescription and others can be bought over the counter.  Ask your nurse if you have questions.     Bring a paper prescription for each of these medications     hydrochlorothiazide 12.5 MG Tabs tablet                Primary Care Provider Office Phone # Fax #    Asif Feliciano [phone] [address] [facility] [address]        Equal Access to Services     FAUSTO SOSA AH: geetha Seay,  brittanie elenijenniferana hernandezdallas anjelica lorenain hayaan adeeg kharash la'aan ah. So [facility] [phone].    ATENCIÓN: Si fer saldivar, tiene a barrios disposición servicios gratuitos de asistencia lingüística. [place] al [phone].    We comply with applicable federal civil rights laws and [place] laws. We do not discriminate on the basis of race, color, national origin, age, disability, sex, sexual orientation, or gender identity.            Thank you!     Thank you for choosing [facility]  for your care. Our goal is always to provide you with excellent care. Hearing back from our patients is one way we can continue to improve our services. Please take a few minutes to complete the written survey that you may receive in the mail after your visit with us. Thank you!             Your Updated Medication List - Protect others around you: Learn how to safely use, store and throw away your medicines at www.disposemymeds.org.          This list is accurate as of 5/1/18  3:47 PM.  Always use your most recent med list.                   Brand Name Dispense Instructions for use Diagnosis    aspirin 81 MG tablet     100 tablet    Take 1 tablet (81 mg) by mouth daily as needed    Essential hypertension with goal blood pressure less than 140/90       atenolol 50 MG tablet    TENORMIN    30 tablet    Take 1 tablet (50 mg) by mouth daily    Essential hypertension with goal blood pressure less than 140/90       cholecalciferol 1000 UNIT tablet    vitamin D3    200 tablet    Take 2 tablets (2,000 Units) by mouth daily    Vitamin D deficiency disease       gabapentin 100 MG capsule    NEURONTIN    60 capsule    Take 1 capsule (100 mg) by mouth 3 times daily    Peripheral polyneuropathy       hydrochlorothiazide 12.5 MG Tabs tablet     90 tablet    Take 1 tablet (12.5 mg) by mouth daily    Essential hypertension with goal blood pressure less than 140/90       melatonin 3 MG tablet     60 tablet    Take 1 tablet  (3 mg) by mouth nightly as needed for sleep    Insomnia, unspecified type       omeprazole 20 MG tablet     90 tablet    Take 1 tablet (20 mg) by mouth daily    Gastroesophageal reflux disease without esophagitis       order for DME     1 each    Equipment being ordered: smygmomanometer    Hypertension goal BP (blood pressure) < 140/90       QUEtiapine 25 MG tablet    SEROQUEL    30 tablet    Take 1 tablet (25 mg) by mouth nightly as needed    Insomnia, unspecified type

## 2018-05-01 NOTE — NURSING NOTE
"Chief Complaint   Patient presents with     Hypertension     Insomnia       Initial /64  Pulse 75  Temp 97.6  F (36.4  C) (Tympanic)  Resp 16  Wt 155 lb (70.3 kg)  SpO2 97%  BMI 24.64 kg/m2 Estimated body mass index is 24.64 kg/(m^2) as calculated from the following:    Height as of 8/11/17: 5' 6.5\" (1.689 m).    Weight as of this encounter: 155 lb (70.3 kg).  Medication Reconciliation: complete   Caitlin Knapp CMA    "
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
## 2018-05-28 DIAGNOSIS — I10 ESSENTIAL HYPERTENSION WITH GOAL BLOOD PRESSURE LESS THAN 140/90: ICD-10-CM

## 2018-05-30 RX ORDER — HYDROCHLOROTHIAZIDE 12.5 MG/1
TABLET ORAL
Qty: 30 TABLET | Refills: 0 | Status: SHIPPED | OUTPATIENT
Start: 2018-05-30 | End: 2018-08-10

## 2018-06-19 DIAGNOSIS — G47.00 INSOMNIA, UNSPECIFIED TYPE: ICD-10-CM

## 2018-06-20 NOTE — TELEPHONE ENCOUNTER
Requested Prescriptions   Pending Prescriptions Disp Refills     QUEtiapine (SEROQUEL) 25 MG tablet [Pharmacy Med Name: QUETIAPINE FUMARATE 25 MG TAB]  Last Written Prescription Date:  5-1-18  Last Fill Quantity: 30tab,  # refills: 1   Last office visit: 5/1/2018 with prescribing provider:     Future Office Visit:     30 tablet 1     Sig: TAKE 1 TABLET (25 MG) BY MOUTH NIGHTLY AS NEEDED    Antipsychotic Medications Failed    6/19/2018  6:27 PM       Failed - Blood pressure under 140/90 in past 12 months    BP Readings from Last 3 Encounters:   05/01/18 144/64   09/19/17 148/84   09/13/17 140/72                Failed - Lipid panel on file within the past 12 months    Recent Labs   Lab Test  12/01/16   1541  08/25/15   0919   CHOL  164  169   TRIG  293*  246*   HDL  37*  45   LDL  68  75   NHDL  127   --    VLDL   --   49*   CHOLHDLRATIO   --   3.8              Failed - CBC on file in past 12 months    Recent Labs   Lab Test  12/01/16   1541   WBC  7.2   RBC  4.75   HGB  14.9   HCT  42.9   PLT  247       For [facility] ONLY: [id number] = WBC, [id number] = RBC         Passed - Patient is 12 years of age or older       Passed - Heart Rate on file within past 12 months    Pulse Readings from Last 3 Encounters:   05/01/18 75   09/19/17 82   09/13/17 78              Passed - A1c or Glucose on file in past 12 months    Recent Labs   Lab Test  09/13/17   1508   08/25/15   0919   GLC  89   < >  91   A1C   --    --   5.4    < > = values in this interval not displayed.       Please review patients last 3 weights. If a weight gain of >10 lbs exists, you may refill the prescription once after instructing the patient to schedule an appointment within the next 30 days.    Wt Readings from Last 3 Encounters:   05/01/18 155 lb (70.3 kg)   09/19/17 146 lb (66.2 kg)   09/13/17 143 lb 8 oz (65.1 kg)            Passed - Recent (6 mo) or future (30 days) visit within the authorizing provider's specialty    Patient had office visit in the last 6  "months or has a visit in the next 30 days with authorizing provider or within the authorizing provider's specialty.  See \"Patient Info\" tab in inbasket, or \"Choose Columns\" in Meds & Orders section of the refill encounter.              "

## 2018-06-21 RX ORDER — QUETIAPINE FUMARATE 25 MG/1
TABLET, FILM COATED ORAL
Qty: 30 TABLET | Refills: 3 | Status: SHIPPED | OUTPATIENT
Start: 2018-06-21 | End: 2018-08-10

## 2018-07-11 ENCOUNTER — PATIENT OUTREACH (OUTPATIENT)
Dept: GERIATRIC MEDICINE | Facility: CLINIC | Age: 78
End: 2018-07-11

## 2018-07-11 NOTE — PROGRESS NOTES
[facility] Care Coordination Contact    [facility] Six-Month Telephone Assessment    6 month telephone assessment completed on 7/11/18.    ER visits: No  Hospitalizations: No  TCU stays: No  Significant health status changes: No  Falls/Injuries: No  ADL/IADL changes: No  Changes in services: No    Caregiver Assessment follow up:  Has paid PCA.     Goals: See POC in chart for goal progress documentation.      Will see member in 6 months for an annual health risk assessment.   Encouraged member to call CC with any questions or concerns in the meantime.     Rafiq Davila RN BSN PHN  [facility]   [phone]  Fax: [phone]

## 2018-08-03 ENCOUNTER — TELEPHONE (OUTPATIENT)
Dept: FAMILY MEDICINE | Facility: CLINIC | Age: 78
End: 2018-08-03

## 2018-08-03 NOTE — TELEPHONE ENCOUNTER
Reason for Call:  Form, our goal is to have forms completed with 72 hours, however, some forms may require a visit or additional information.    Type of letter, form or note:  [facility]    Who is the form from?: Patient    Where did the form come from: Patient or family brought in       What clinic location was the form placed at?: [facility]    Where the form was placed: Given to [facility]    What number is listed as a contact on the form?: [phone]       Additional comments: Pt has upcoming appt but said form can't wait.  Please fax form to  [phone].  Pt would like a copy to .  Please call him at home.    Call taken on 8/3/2018 at 2:20 PM by MICHELLE SOW

## 2018-08-10 ENCOUNTER — TELEPHONE (OUTPATIENT)
Dept: FAMILY MEDICINE | Facility: CLINIC | Age: 78
End: 2018-08-10

## 2018-08-10 ENCOUNTER — OFFICE VISIT (OUTPATIENT)
Dept: FAMILY MEDICINE | Facility: CLINIC | Age: 78
End: 2018-08-10
Payer: MEDICARE

## 2018-08-10 VITALS
DIASTOLIC BLOOD PRESSURE: 76 MMHG | BODY MASS INDEX: 23.93 KG/M2 | HEART RATE: 58 BPM | TEMPERATURE: 96.6 F | WEIGHT: 150.5 LBS | SYSTOLIC BLOOD PRESSURE: 144 MMHG | OXYGEN SATURATION: 95 %

## 2018-08-10 DIAGNOSIS — G47.00 INSOMNIA, UNSPECIFIED TYPE: ICD-10-CM

## 2018-08-10 DIAGNOSIS — R73.9 HYPERGLYCEMIA: ICD-10-CM

## 2018-08-10 DIAGNOSIS — D21.9: ICD-10-CM

## 2018-08-10 DIAGNOSIS — G83.20: ICD-10-CM

## 2018-08-10 DIAGNOSIS — S09.90XD INJURY OF HEAD, SUBSEQUENT ENCOUNTER: ICD-10-CM

## 2018-08-10 DIAGNOSIS — E55.9 VITAMIN D DEFICIENCY DISEASE: ICD-10-CM

## 2018-08-10 DIAGNOSIS — Z23 NEED FOR PROPHYLACTIC VACCINATION AGAINST STREPTOCOCCUS PNEUMONIAE (PNEUMOCOCCUS): ICD-10-CM

## 2018-08-10 DIAGNOSIS — G62.9 PERIPHERAL POLYNEUROPATHY: ICD-10-CM

## 2018-08-10 DIAGNOSIS — I10 HYPERTENSION GOAL BP (BLOOD PRESSURE) < 140/90: ICD-10-CM

## 2018-08-10 DIAGNOSIS — Z12.5 SPECIAL SCREENING FOR MALIGNANT NEOPLASM OF PROSTATE: ICD-10-CM

## 2018-08-10 DIAGNOSIS — Z00.00 MEDICARE ANNUAL WELLNESS VISIT, SUBSEQUENT: Primary | ICD-10-CM

## 2018-08-10 DIAGNOSIS — R20.0 NUMBNESS IN FEET: ICD-10-CM

## 2018-08-10 DIAGNOSIS — I10 ESSENTIAL HYPERTENSION WITH GOAL BLOOD PRESSURE LESS THAN 140/90: ICD-10-CM

## 2018-08-10 DIAGNOSIS — K21.9 GASTROESOPHAGEAL REFLUX DISEASE WITHOUT ESOPHAGITIS: ICD-10-CM

## 2018-08-10 LAB
HBA1C MFR BLD: 5.1 % (ref 0–5.6)
PSA SERPL-ACNC: 2.55 UG/L (ref 0–4)

## 2018-08-10 PROCEDURE — 83036 HEMOGLOBIN GLYCOSYLATED A1C: CPT | Performed by: FAMILY MEDICINE

## 2018-08-10 PROCEDURE — G0439 PPPS, SUBSEQ VISIT: HCPCS | Performed by: FAMILY MEDICINE

## 2018-08-10 PROCEDURE — G0103 PSA SCREENING: HCPCS | Performed by: FAMILY MEDICINE

## 2018-08-10 PROCEDURE — 84460 ALANINE AMINO (ALT) (SGPT): CPT | Performed by: FAMILY MEDICINE

## 2018-08-10 PROCEDURE — 36415 COLL VENOUS BLD VENIPUNCTURE: CPT | Performed by: FAMILY MEDICINE

## 2018-08-10 PROCEDURE — 80048 BASIC METABOLIC PNL TOTAL CA: CPT | Performed by: FAMILY MEDICINE

## 2018-08-10 RX ORDER — QUETIAPINE FUMARATE 50 MG/1
50 TABLET, FILM COATED ORAL
Qty: 30 TABLET | Refills: 11 | Status: SHIPPED | OUTPATIENT
Start: 2018-08-10 | End: 2019-03-15

## 2018-08-10 RX ORDER — ATENOLOL 50 MG/1
50 TABLET ORAL DAILY
Qty: 30 TABLET | Refills: 11 | Status: SHIPPED | OUTPATIENT
Start: 2018-08-10 | End: 2019-03-15

## 2018-08-10 RX ORDER — GABAPENTIN 100 MG/1
100 CAPSULE ORAL 3 TIMES DAILY
Qty: 60 CAPSULE | Refills: 11 | Status: SHIPPED | OUTPATIENT
Start: 2018-08-10 | End: 2019-03-15

## 2018-08-10 RX ORDER — HYDROCHLOROTHIAZIDE 12.5 MG/1
12.5 TABLET ORAL DAILY
Qty: 90 TABLET | Refills: 3 | Status: SHIPPED | OUTPATIENT
Start: 2018-08-10 | End: 2018-12-13

## 2018-08-10 RX ORDER — NICOTINE POLACRILEX 4 MG/1
20 GUM, CHEWING ORAL DAILY
Qty: 90 TABLET | Refills: 3 | Status: SHIPPED | OUTPATIENT
Start: 2018-08-10 | End: 2019-03-15

## 2018-08-10 ASSESSMENT — ACTIVITIES OF DAILY LIVING (ADL)
CURRENT_FUNCTION: SHOPPING REQUIRES ASSISTANCE
CURRENT_FUNCTION: MEDICATION ADMINISTRATION REQUIRES ASSISTANCE
I_NEED_ASSISTANCE_FOR_THE_FOLLOWING_DAILY_ACTIVITIES:: PREPARING MEALS
CURRENT_FUNCTION: TRANSPORTATION REQUIRES ASSISTANCE
I_NEED_ASSISTANCE_FOR_THE_FOLLOWING_DAILY_ACTIVITIES:: TRANSPORTATION
I_NEED_ASSISTANCE_FOR_THE_FOLLOWING_DAILY_ACTIVITIES:: MEDICATION ADMINISTRATION
I_NEED_ASSISTANCE_FOR_THE_FOLLOWING_DAILY_ACTIVITIES:: LAUNDRY
CURRENT_FUNCTION: LAUNDRY REQUIRES ASSISTANCE
CURRENT_FUNCTION: HOUSEWORK REQUIRES ASSISTANCE
I_NEED_ASSISTANCE_FOR_THE_FOLLOWING_DAILY_ACTIVITIES:: SHOPPING
I_NEED_ASSISTANCE_FOR_THE_FOLLOWING_DAILY_ACTIVITIES:: HOUSEWORK
CURRENT_FUNCTION: PREPARING MEALS REQUIRES ASSISTANCE

## 2018-08-10 NOTE — MR AVS SNAPSHOT
After Visit Summary   8/10/2018    Lazaro Paulson    MRN: [id number]           Patient Information     Date Of Birth          1940        Visit Information        Provider Department      8/10/2018 11:30 AM Asif Feliciano MD; [employer] [facility]        Today's Diagnoses     Medicare annual wellness visit, subsequent    -  1    Need for prophylactic vaccination against Streptococcus pneumoniae (pneumococcus)        Soft tissue tumor, benign        Gastroesophageal reflux disease without esophagitis        Numbness in feet        PARALYSIS ARM - DOMINANT SIDE        Hypertension goal BP (blood pressure) < 140/90        Injury of head, subsequent encounter        Insomnia, unspecified type        Special screening for malignant neoplasm of prostate        Hyperglycemia        Essential hypertension with goal blood pressure less than 140/90        Peripheral polyneuropathy        Vitamin D deficiency disease          Care Instructions      Preventive Health Recommendations:       Male Ages 65 and over    Yearly exam:             See your health care provider every year in order to  o   Review health changes.   o   Discuss preventive care.    o   Review your medicines if your doctor has prescribed any.    Talk with your health care provider about whether you should have a test to screen for prostate cancer (PSA).    Every 3 years, have a diabetes test (fasting glucose). If you are at risk for diabetes, you should have this test more often.    Every 5 years, have a cholesterol test. Have this test more often if you are at risk for high cholesterol or heart disease.     Every 10 years, have a colonoscopy. Or, have a yearly FIT test (stool test). These exams will check for colon cancer.    Talk to with your health care provider about screening for Abdominal Aortic Aneurysm if you have a family history of AAA or have a history of  "smoking.  Shots:     Get a flu shot each year.     Get a tetanus shot every 10 years.     Talk to your doctor about your pneumonia vaccines. There are now two you should receive - Pneumovax (PPSV 23) and Prevnar (PCV 13).    Talk to your pharmacist about a shingles vaccine.     Talk to your doctor about the hepatitis B vaccine.  Nutrition:     Eat at least 5 servings of fruits and vegetables each day.     Eat whole-grain bread, whole-wheat pasta and brown rice instead of white grains and rice.     Get adequate Calcium and Vitamin D.   Lifestyle    Exercise for at least 150 minutes a week (30 minutes a day, 5 days a week). This will help you control your weight and prevent disease.     Limit alcohol to one drink per day.     No smoking.     Wear sunscreen to prevent skin cancer.     See your dentist every six months for an exam and cleaning.     See your eye doctor every 1 to 2 years to screen for conditions such as glaucoma, macular degeneration and cataracts.       What You Can Do to Reduce Cholesterol Levels  and Reduce Risk of Diabetes, Heart, and Blood Vessel Disease  1. Eat six to eight servings of green or root vegetables or fruit (raw or cooked) per day. You need 35 grams of fiber per day.  Examples: carrots apples  broccoli oranges  spinach grapefruit  lettuce pears  bananas  2. Use up to 2 cup of walnuts, almonds, or pecans as a source of \"good\" fat per day.  3. Drink one to three servings of soy milk (great for cereal) or 2 cup of soynuts per day.  4. Use margarine with reduced trans or saturated fats (e.g. Benecol, Smart Balance, olive oil margarine) as replacement for butter or margarine.  5. Eat fewer or half-portions of desserts, baked goods, chips, cookies, processed flour and sugar, pasta, butter, cream, non-skim dairy, ice cream.  6. Use olive or canola oil as the only oils for cooking and dressings.  7. Use one tablespoon of ground flaxseed or Natural Ovens \"Energy Mix\" per day (great on cereal or " over salad).  8. Eat one to two servings per week of wild salmon or water-packed tuna.  9. Limit beef, lamb, and pork to at most one serving per day. (Range-fed beef, if you can get it, is much preferred and allows for greater use in diet).  10. Eat three to four servings per day of whole grains (legumes, rice, wheat, oats).  11. Limit sodas and beer at most to three per week (only special occasions).  12. 65 percent of us need to lose weight and all of us need to keep moving! You should be walking at least 150 minutes per week. You should lose approximately seven percent of your weight in the next year if overweight. Check with me for more details.  1. Andrew Weil's paperback book, The Healthy Kitchen, is a great addition to your healthy lifestyle library.  2. American Diabetic Association (www.diabetes.org) - a wealth of information on nutritional excellence.  3. Other great websites for Mediterranean diets are available.    (Z00.00) Medicare annual wellness visit, subsequent  (primary encounter diagnosis)  Comment:    Plan:      (Z23) Need for prophylactic vaccination against Streptococcus pneumoniae (pneumococcus)  Comment:    Plan:      (D21.9) Soft tissue tumor, benign  Comment:    Plan:      (K21.9) Gastroesophageal reflux disease without esophagitis  Comment:    Plan: omeprazole 20 MG tablet             (R20.0) Numbness in feet  Comment:    Plan:      (G83.20) PARALYSIS ARM - DOMINANT SIDE  Comment:    Plan:      (I10) Hypertension goal BP (blood pressure) < 140/90  Comment:     Plan: ALT, Basic metabolic panel             (S09.90XD) Injury of head, subsequent encounter  Comment:    Plan:      (G47.00) Insomnia, unspecified type  Comment:    Plan: QUEtiapine (SEROQUEL) 50 MG tablet             (Z12.5) Special screening for malignant neoplasm of prostate  Comment:    Plan: Prostate spec antigen screen             (R73.9) Hyperglycemia  Comment:    Plan: Hemoglobin A1c              (I10) Essential hypertension  with goal blood pressure less than 140/90  Comment:        Plan: hydrochlorothiazide 12.5 MG TABS tablet,         atenolol (TENORMIN) 50 MG tablet, aspirin 81 MG        tablet             (G62.9) Peripheral polyneuropathy  Comment:    Plan: gabapentin (NEURONTIN) 100 MG capsule             (E55.9) Vitamin D deficiency disease  Comment:    Plan: cholecalciferol (VITAMIN D3) 1000 UNIT tablet                   .          Follow-ups after your visit        Follow-up notes from your care team     Return in about 1 year (around 8/10/2019) for Physical Exam.      Who to contact     If you have questions or need follow up information about today's clinic visit or your schedule please contact [facility] directly at [phone].  Normal or non-critical lab and imaging results will be communicated to you by MyChart, letter or phone within 4 business days after the clinic has received the results. If you do not hear from us within 7 days, please contact the clinic through MyChart or phone. If you have a critical or abnormal lab result, we will notify you by phone as soon as possible.  Submit refill requests through [employer] or call your pharmacy and they will forward the refill request to us. Please allow 3 business days for your refill to be completed.          Additional Information About Your Visit        Care EveryWhere ID     This is your Care EveryWhere ID. This could be used by other organizations to access your [place] medical records  [id number]        Your Vitals Were     Pulse Temperature Pulse Oximetry BMI (Body Mass Index)          58 96.6  F (35.9  C) (Tympanic) 95% 23.93 kg/m2         Blood Pressure from Last 3 Encounters:   08/10/18 144/76   05/01/18 144/64   09/19/17 148/84    Weight from Last 3 Encounters:   08/10/18 150 lb 8 oz (68.3 kg)   05/01/18 155 lb (70.3 kg)   09/19/17 146 lb (66.2 kg)              We Performed the Following     ALT     Basic metabolic panel      Hemoglobin A1c     Prostate spec antigen screen          Today's Medication Changes          These changes are accurate as of 8/10/18 12:44 PM.  If you have any questions, ask your nurse or doctor.               Start taking these medicines.        Dose/Directions    QUEtiapine 50 MG tablet   Commonly known as:  SEROQUEL   Used for:  Insomnia, unspecified type   Started by:  Asif Feliciano MD        Dose:  50 mg   Take 1 tablet (50 mg) by mouth nightly as needed   Quantity:  30 tablet   Refills:  11            Where to get your medicines      These medications were sent to [place] [address] [place] [facility] [address]  [address] [facility] [address]     Phone:  [phone]     aspirin 81 MG tablet    atenolol 50 MG tablet    cholecalciferol 1000 UNIT tablet    gabapentin 100 MG capsule    hydrochlorothiazide 12.5 MG Tabs tablet    omeprazole 20 MG tablet    QUEtiapine 50 MG tablet                Primary Care Provider Office Phone # Fax #    Asif Feliciano [phone]       [address] [address]        Equal Access to Services     [facility]: Hadii aad ku hadasho Soomaali, waaxda luqadaha, qaybta kaalmada adeegyada, waxay idiin haymaury narvaez . So [facility] [phone].    ATENCIÓN: Si habla español, tiene a barrios disposición servicios gratuitos de asistencia lingüística. Llame al [phone].    We comply with applicable federal civil rights laws and [place] laws. We do not discriminate on the basis of race, color, national origin, age, disability, sex, sexual orientation, or gender identity.            Thank you!     Thank you for choosing [facility]  for your care. Our goal is always to provide you with excellent care. Hearing back from our patients is one way we can continue to improve our services. Please take a few minutes to complete the written survey that you may receive in the mail  after your visit with us. Thank you!             Your Updated Medication List - Protect others around you: Learn how to safely use, store and throw away your medicines at www.disposemymeds.org.          This list is accurate as of 8/10/18 12:44 PM.  Always use your most recent med list.                   Brand Name Dispense Instructions for use Diagnosis    aspirin 81 MG tablet     100 tablet    Take 1 tablet (81 mg) by mouth daily as needed    Essential hypertension with goal blood pressure less than 140/90       atenolol 50 MG tablet    TENORMIN    30 tablet    Take 1 tablet (50 mg) by mouth daily    Essential hypertension with goal blood pressure less than 140/90       cholecalciferol 1000 UNIT tablet    vitamin D3    200 tablet    Take 2 tablets (2,000 Units) by mouth daily    Vitamin D deficiency disease       gabapentin 100 MG capsule    NEURONTIN    60 capsule    Take 1 capsule (100 mg) by mouth 3 times daily    Peripheral polyneuropathy       hydrochlorothiazide 12.5 MG Tabs tablet     90 tablet    Take 1 tablet (12.5 mg) by mouth daily    Essential hypertension with goal blood pressure less than 140/90       omeprazole 20 MG tablet     90 tablet    Take 1 tablet (20 mg) by mouth daily    Gastroesophageal reflux disease without esophagitis       QUEtiapine 50 MG tablet    SEROQUEL    30 tablet    Take 1 tablet (50 mg) by mouth nightly as needed    Insomnia, unspecified type
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
## 2018-08-10 NOTE — LETTER
August 13, 2018      Lazaro Paulson  [address]   [facility] [address]        Dear ,    We are writing to inform you of your test results.    NORMAL THREE MONTH GLUCOSE AVERAGE   NORMAL PSA OR PROSTATE CANCER SCREENING TEST,     NORMAL LIVER FUNCTION TEST   NORMAL GLUCOSE, RENAL AND BLOOD SALTS    Resulted Orders   Hemoglobin A1c   Result Value Ref Range    Hemoglobin A1C 5.1 0 - 5.6 %      Comment:      Normal <5.7% Prediabetes 5.7-6.4%  Diabetes 6.5% or higher - adopted from ADA   consensus guidelines.     ALT   Result Value Ref Range    ALT 25 0 - 70 U/L   Basic metabolic panel   Result Value Ref Range    Sodium 138 133 - 144 mmol/L    Potassium 3.5 3.4 - 5.3 mmol/L    Chloride 104 94 - 109 mmol/L    Carbon Dioxide 26 20 - 32 mmol/L    Anion Gap 8 3 - 14 mmol/L    Glucose 84 70 - 99 mg/dL    Urea Nitrogen 9 7 - 30 mg/dL    Creatinine 1.13 0.66 - 1.25 mg/dL    GFR Estimate 63 >60 mL/min/1.7m2      Comment:      Non  GFR Calc    GFR Estimate If Black 76 >60 mL/min/1.7m2      Comment:       GFR Calc    Calcium 9.2 8.5 - 10.1 mg/dL   Prostate spec antigen screen   Result Value Ref Range    PSA 2.55 0 - 4 ug/L      Comment:      Assay Method:  Chemiluminescence using Siemens Vista analyzer       If you have any questions or concerns, please call the clinic at the number listed above.       Sincerely,        LALI HART MD

## 2018-08-10 NOTE — PATIENT INSTRUCTIONS
Preventive Health Recommendations:       Male Ages 65 and over    Yearly exam:             See your health care provider every year in order to  o   Review health changes.   o   Discuss preventive care.    o   Review your medicines if your doctor has prescribed any.    Talk with your health care provider about whether you should have a test to screen for prostate cancer (PSA).    Every 3 years, have a diabetes test (fasting glucose). If you are at risk for diabetes, you should have this test more often.    Every 5 years, have a cholesterol test. Have this test more often if you are at risk for high cholesterol or heart disease.     Every 10 years, have a colonoscopy. Or, have a yearly FIT test (stool test). These exams will check for colon cancer.    Talk to with your health care provider about screening for Abdominal Aortic Aneurysm if you have a family history of AAA or have a history of smoking.  Shots:     Get a flu shot each year.     Get a tetanus shot every 10 years.     Talk to your doctor about your pneumonia vaccines. There are now two you should receive - Pneumovax (PPSV 23) and Prevnar (PCV 13).    Talk to your pharmacist about a shingles vaccine.     Talk to your doctor about the hepatitis B vaccine.  Nutrition:     Eat at least 5 servings of fruits and vegetables each day.     Eat whole-grain bread, whole-wheat pasta and brown rice instead of white grains and rice.     Get adequate Calcium and Vitamin D.   Lifestyle    Exercise for at least 150 minutes a week (30 minutes a day, 5 days a week). This will help you control your weight and prevent disease.     Limit alcohol to one drink per day.     No smoking.     Wear sunscreen to prevent skin cancer.     See your dentist every six months for an exam and cleaning.     See your eye doctor every 1 to 2 years to screen for conditions such as glaucoma, macular degeneration and cataracts.       What You Can Do to Reduce Cholesterol Levels  and Reduce Risk  "of Diabetes, Heart, and Blood Vessel Disease  1. Eat six to eight servings of green or root vegetables or fruit (raw or cooked) per day. You need 35 grams of fiber per day.  Examples: carrots apples  broccoli oranges  spinach grapefruit  lettuce pears  bananas  2. Use up to 2 cup of walnuts, almonds, or pecans as a source of \"good\" fat per day.  3. Drink one to three servings of soy milk (great for cereal) or 2 cup of soynuts per day.  4. Use margarine with reduced trans or saturated fats (e.g. Benecol, Smart Balance, olive oil margarine) as replacement for butter or margarine.  5. Eat fewer or half-portions of desserts, baked goods, chips, cookies, processed flour and sugar, pasta, butter, cream, non-skim dairy, ice cream.  6. Use olive or canola oil as the only oils for cooking and dressings.  7. Use one tablespoon of ground flaxseed or Natural Ovens \"Energy Mix\" per day (great on cereal or over salad).  8. Eat one to two servings per week of wild salmon or water-packed tuna.  9. Limit beef, lamb, and pork to at most one serving per day. (Range-fed beef, if you can get it, is much preferred and allows for greater use in diet).  10. Eat three to four servings per day of whole grains (legumes, rice, wheat, oats).  11. Limit sodas and beer at most to three per week (only special occasions).  12. 65 percent of us need to lose weight and all of us need to keep moving! You should be walking at least 150 minutes per week. You should lose approximately seven percent of your weight in the next year if overweight. Check with me for more details.  1. Andrew Weil's paperback book, The Healthy Kitchen, is a great addition to your healthy lifestyle library.  2. American Diabetic Association (www.diabetes.org) - a wealth of information on nutritional excellence.  3. Other great websites for Mediterranean diets are available.    (Z00.00) Medicare annual wellness visit, subsequent  (primary encounter diagnosis)  Comment:    Plan:  "     (Z23) Need for prophylactic vaccination against Streptococcus pneumoniae (pneumococcus)  Comment:    Plan:      (D21.9) Soft tissue tumor, benign  Comment:    Plan:      (K21.9) Gastroesophageal reflux disease without esophagitis  Comment:    Plan: omeprazole 20 MG tablet             (R20.0) Numbness in feet  Comment:    Plan:      (G83.20) PARALYSIS ARM - DOMINANT SIDE  Comment:    Plan:      (I10) Hypertension goal BP (blood pressure) < 140/90  Comment:     Plan: ALT, Basic metabolic panel             (S09.90XD) Injury of head, subsequent encounter  Comment:    Plan:      (G47.00) Insomnia, unspecified type  Comment:    Plan: QUEtiapine (SEROQUEL) 50 MG tablet             (Z12.5) Special screening for malignant neoplasm of prostate  Comment:    Plan: Prostate spec antigen screen             (R73.9) Hyperglycemia  Comment:    Plan: Hemoglobin A1c              (I10) Essential hypertension with goal blood pressure less than 140/90  Comment:        Plan: hydrochlorothiazide 12.5 MG TABS tablet,         atenolol (TENORMIN) 50 MG tablet, aspirin 81 MG        tablet             (G62.9) Peripheral polyneuropathy  Comment:    Plan: gabapentin (NEURONTIN) 100 MG capsule             (E55.9) Vitamin D deficiency disease  Comment:    Plan: cholecalciferol (VITAMIN D3) 1000 UNIT tablet                   .

## 2018-08-10 NOTE — TELEPHONE ENCOUNTER
Asif Feliciano MD  P [facility] Triage                     Call pharmacy to stop melatonin 3 mg       Assume [facility] [place] [facility] [address] is the pharmacy referred to in this message.     Called the pharmacy and let them know.

## 2018-08-11 LAB
ALT SERPL W P-5'-P-CCNC: 25 U/L (ref 0–70)
ANION GAP SERPL CALCULATED.3IONS-SCNC: 8 MMOL/L (ref 3–14)
BUN SERPL-MCNC: 9 MG/DL (ref 7–30)
CALCIUM SERPL-MCNC: 9.2 MG/DL (ref 8.5–10.1)
CHLORIDE SERPL-SCNC: 104 MMOL/L (ref 94–109)
CO2 SERPL-SCNC: 26 MMOL/L (ref 20–32)
CREAT SERPL-MCNC: 1.13 MG/DL (ref 0.66–1.25)
GFR SERPL CREATININE-BSD FRML MDRD: 63 ML/MIN/1.7M2
GLUCOSE SERPL-MCNC: 84 MG/DL (ref 70–99)
POTASSIUM SERPL-SCNC: 3.5 MMOL/L (ref 3.4–5.3)
SODIUM SERPL-SCNC: 138 MMOL/L (ref 133–144)

## 2018-12-06 ENCOUNTER — PATIENT OUTREACH (OUTPATIENT)
Dept: GERIATRIC MEDICINE | Facility: CLINIC | Age: 78
End: 2018-12-06

## 2018-12-06 ASSESSMENT — ACTIVITIES OF DAILY LIVING (ADL): DEPENDENT_IADLS:: CLEANING;COOKING;LAUNDRY;SHOPPING;MEAL PREPARATION;TRANSPORTATION

## 2018-12-06 NOTE — PROGRESS NOTES
[facility] Care Coordination Contact    [facility] Home Visit Assessment     Home visit for Health Risk Assessment with Lazaro Paulson completed on December 6, 2018    Type of residence:: Apartment - handicap accessible  Current living arrangement:: I live alone     Assessment completed with:: Patient    Current Care Plan  Member currently receiving the following home care services:     Member currently receiving the following community resources: DME, PCA, Transportation Services      Medication Review  Medication reconciliation completed in Epic: Yes  Medication set-up & administration: Independent-does not set up.  Self-administers medications.  Medication Risk Assessment Medication (1 or more, place referral to MTM): N/A: No risk factors identified  MTM Referral Placed: No: No risk factors idenified    Mental/Behavioral Health   Depression Screening: See PHQ assessment flowsheet.   Mental health DX:: Yes   Mental health DX how managed:: Medication  Mental Health Diagnosis: Yes: 780.52  Mental Health Services: None: No further intervention needed at this time.    Falls Assessment:   Fallen 2 or more times in the past year?: No   Any fall with injury in the past year?: No    ADL/IADL Dependencies:   Dependent ADLs:: Ambulation-cane, Bathing, Dressing, Grooming, Transfers, Toileting  Dependent IADLs:: Cleaning, Cooking, Laundry, Shopping, Meal Preparation, Transportation    [facility] sponsored benefits: Shared information re: Silver Sneakers/gym memberships, ASA, Calcium +D.    PCA Assessment completed at visit: Yes     Elderly Waiver Eligibility: N/A    Care Plan & Recommendations: Continue current care plan.     See LTCC for detailed assessment information.    Follow-Up Plan: Member informed of future contact, plan to f/u with member with a 6 month telephone assessment.  Contact information shared with member and family, encouraged member to call with any questions or concerns at any  time.    [place] care continuum providers: Please refer to Health Care Home on the Epic Problem List to view this patient's [facility] Care Plan Summary.

## 2018-12-10 ENCOUNTER — PATIENT OUTREACH (OUTPATIENT)
Dept: GERIATRIC MEDICINE | Facility: CLINIC | Age: 78
End: 2018-12-10

## 2018-12-10 NOTE — LETTER
[address], [address]  [place] [address]  Phone:  [phone]  Fax:  [phone]        December 10, 2018    ALEXUS DAVIS  [address]   [facility] [address]    Dear Betty Willis is a copy of your completed PCA Assessment and Service Plan.  This is for your records and no action is required by you.  If you have additional questions regarding your assessment please contact me at [phone]. If you feel that your needs are not being met, please contact the Clinical Supervisor at [phone].    Sincerely,      Rafiq Davila RN, PHN  Care Coordinator  [facility]  [phone]      Enclosure:  Completed PCA assessment

## 2018-12-10 NOTE — PROGRESS NOTES
[facility] Care Coordination Contact    [facility]:  Emailed completed PCA assessment to [facility].  Faxed copy of PCA assessment to PCA Agency and mailed copy to member.  Faxed MD Communication to PCP.   Helen Hernandez  Case Management Specialist  [facility]  [phone]

## 2018-12-11 NOTE — PROGRESS NOTES
[facility] Care Coordination Contact    [facility] Home Visit Assessment     Home visit for Health Risk Assessment with Lazaro Paulson completed on December 06, 2018    Type of residence:: Apartment - handicap accessible  Current living arrangement:: I live alone     Assessment completed with:: Patient    Current Care Plan  Member currently receiving the following home care services:     Member currently receiving the following community resources: DME, PCA, Transportation Services      Medication Review  Medication reconciliation completed in Epic: Yes  Medication set-up & administration: Independent-does not set up.  Self-administers medications.  Medication Risk Assessment Medication (1 or more, place referral to MTM): N/A: No risk factors identified  MTM Referral Placed: No: No risk factors idenified    Mental/Behavioral Health   Depression Screening: See PHQ assessment flowsheet.   Mental health DX:: Yes   Mental health DX how managed:: Medication  Mental Health Diagnosis: Yes: G47.00  Mental Health Services: None: No further intervention needed at this time.    Falls Assessment:   Fallen 2 or more times in the past year?: No   Any fall with injury in the past year?: No    ADL/IADL Dependencies:   Dependent ADLs:: Ambulation-cane, Bathing, Dressing, Grooming, Transfers, Toileting  Dependent IADLs:: Cleaning, Cooking, Laundry, Shopping, Meal Preparation, Transportation    [facility] sponsored benefits: Shared information re: Silver Sneakers/gym memberships, ASA, Calcium +D.    PCA Assessment completed at visit: No     Elderly Waiver Eligibility: Yes, but member declines EW services; will not open to EW    Care Plan & Recommendations: No changes to current care plan.     See CC for detailed assessment information.    Follow-Up Plan: Member informed of future contact, plan to f/u with member with a 6 month telephone assessment.  Contact information shared with member and family, encouraged  member to call with any questions or concerns at any time.    [place] care continuum providers: Please refer to Health Care Home on the Epic Problem List to view this patient's [facility] Care Plan Summary.    Rafiq Davila RN BSN PHN  [facility] Care Coordinator  [phone]  Fax:[phone]

## 2018-12-12 ASSESSMENT — PATIENT HEALTH QUESTIONNAIRE - PHQ9: SUM OF ALL RESPONSES TO PHQ QUESTIONS 1-9: 4

## 2018-12-13 ENCOUNTER — OFFICE VISIT (OUTPATIENT)
Dept: FAMILY MEDICINE | Facility: CLINIC | Age: 78
End: 2018-12-13
Payer: MEDICARE

## 2018-12-13 VITALS
WEIGHT: 147 LBS | TEMPERATURE: 96.8 F | DIASTOLIC BLOOD PRESSURE: 76 MMHG | RESPIRATION RATE: 16 BRPM | HEART RATE: 61 BPM | SYSTOLIC BLOOD PRESSURE: 140 MMHG | BODY MASS INDEX: 23.37 KG/M2 | OXYGEN SATURATION: 100 %

## 2018-12-13 DIAGNOSIS — E55.9 VITAMIN D DEFICIENCY: ICD-10-CM

## 2018-12-13 DIAGNOSIS — K92.1 BLOOD IN STOOL: ICD-10-CM

## 2018-12-13 DIAGNOSIS — I10 HYPERTENSION GOAL BP (BLOOD PRESSURE) < 140/90: Primary | ICD-10-CM

## 2018-12-13 DIAGNOSIS — Z83.3 FAMILY HISTORY OF DIABETES MELLITUS: ICD-10-CM

## 2018-12-13 DIAGNOSIS — K21.9 GASTROESOPHAGEAL REFLUX DISEASE WITHOUT ESOPHAGITIS: ICD-10-CM

## 2018-12-13 DIAGNOSIS — R20.0 NUMBNESS IN FEET: ICD-10-CM

## 2018-12-13 DIAGNOSIS — H61.23 BILATERAL IMPACTED CERUMEN: ICD-10-CM

## 2018-12-13 DIAGNOSIS — K64.1 GRADE II HEMORRHOIDS: ICD-10-CM

## 2018-12-13 DIAGNOSIS — Z12.5 SCREENING PSA (PROSTATE SPECIFIC ANTIGEN): ICD-10-CM

## 2018-12-13 LAB
ERYTHROCYTE [DISTWIDTH] IN BLOOD BY AUTOMATED COUNT: 12.7 % (ref 10–15)
HCT VFR BLD AUTO: 43 % (ref 40–53)
HGB BLD-MCNC: 14.8 G/DL (ref 13.3–17.7)
MCH RBC QN AUTO: 31.2 PG (ref 26.5–33)
MCHC RBC AUTO-ENTMCNC: 34.4 G/DL (ref 31.5–36.5)
MCV RBC AUTO: 91 FL (ref 78–100)
PLATELET # BLD AUTO: 219 10E9/L (ref 150–450)
RBC # BLD AUTO: 4.75 10E12/L (ref 4.4–5.9)
WBC # BLD AUTO: 7.1 10E9/L (ref 4–11)

## 2018-12-13 PROCEDURE — 99207 HC REMOVE IMPACTED CERUMEN: CPT | Performed by: FAMILY MEDICINE

## 2018-12-13 PROCEDURE — 85027 COMPLETE CBC AUTOMATED: CPT | Performed by: FAMILY MEDICINE

## 2018-12-13 PROCEDURE — 36415 COLL VENOUS BLD VENIPUNCTURE: CPT | Performed by: FAMILY MEDICINE

## 2018-12-13 PROCEDURE — 82306 VITAMIN D 25 HYDROXY: CPT | Performed by: FAMILY MEDICINE

## 2018-12-13 PROCEDURE — 80061 LIPID PANEL: CPT | Performed by: FAMILY MEDICINE

## 2018-12-13 PROCEDURE — 99214 OFFICE O/P EST MOD 30 MIN: CPT | Mod: 25 | Performed by: FAMILY MEDICINE

## 2018-12-13 PROCEDURE — 82043 UR ALBUMIN QUANTITATIVE: CPT | Performed by: FAMILY MEDICINE

## 2018-12-13 RX ORDER — BENZOCAINE/MENTHOL 6 MG-10 MG
LOZENGE MUCOUS MEMBRANE ONCE
Status: DISCONTINUED | OUTPATIENT
Start: 2018-12-13 | End: 2020-07-28

## 2018-12-13 RX ORDER — AMLODIPINE BESYLATE 2.5 MG/1
2.5 TABLET ORAL DAILY
Qty: 90 TABLET | Refills: 3 | Status: SHIPPED | OUTPATIENT
Start: 2018-12-13 | End: 2019-03-15
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
## 2018-12-13 NOTE — LETTER
"December 14, 2018      Lazaro Paulson  [address]   [facility] [address]        Dear ,    We are writing to inform you of your test results.    ABNORMAL DIABETES URINE PROTEIN TEST   NORMAL TOTAL CHOLESTEROL   HIGH TRIGLYCERIDES   NORMAL HDL OR \"GOOD\" CHOLESTEROL   BORDERLINE  LDL OR \"BAD\" CHOLESTEROL   NORMAL COMPLETE BLOOD PANEL WBCS RBCS AND PLATELETS     Resulted Orders   Lipid panel reflex to direct LDL Fasting   Result Value Ref Range    Cholesterol 187 <200 mg/dL    Triglycerides 178 (H) <150 mg/dL      Comment:      Borderline high:  150-199 mg/dl  High:             200-499 mg/dl  Very high:       >499 mg/dl  Fasting specimen      HDL Cholesterol 49 >39 mg/dL    LDL Cholesterol Calculated 102 (H) <100 mg/dL      Comment:      Above desirable:  100-129 mg/dl  Borderline High:  130-159 mg/dL  High:             160-189 mg/dL  Very high:       >189 mg/dl      Non HDL Cholesterol 138 (H) <130 mg/dL      Comment:      Above Desirable:  130-159 mg/dl  Borderline high:  160-189 mg/dl  High:             190-219 mg/dl  Very high:       >219 mg/dl     Albumin Random Urine Quantitative with Creat Ratio   Result Value Ref Range    Creatinine Urine 258 mg/dL    Albumin Urine mg/L 66 mg/L    Albumin Urine mg/g Cr 25.54 (H) 0 - 17 mg/g Cr   CBC with platelets   Result Value Ref Range    WBC 7.1 4.0 - 11.0 10e9/L    RBC Count 4.75 4.4 - 5.9 10e12/L    Hemoglobin 14.8 13.3 - 17.7 g/dL    Hematocrit 43.0 40.0 - 53.0 %    MCV 91 78 - 100 fl    MCH 31.2 26.5 - 33.0 pg    MCHC 34.4 31.5 - 36.5 g/dL    RDW 12.7 10.0 - 15.0 %    Platelet Count 219 150 - 450 10e9/L   Vitamin D Deficiency   Result Value Ref Range    Vitamin D Deficiency screening 66 20 - 75 ug/L      Comment:      Season, race, dietary intake, and treatment affect the concentration of   25-hydroxy-Vitamin D. Values may decrease during winter months and increase   during summer months. Values 20-29 ug/L may indicate Vitamin D " insufficiency   and values <20 ug/L may indicate Vitamin D deficiency.  Vitamin D determination is routinely performed by an immunoassay specific for   25 hydroxyvitamin D3.  If an individual is on vitamin D2 (ergocalciferol)   supplementation, please specify 25 OH vitamin D2 and D3 level determination by   LCMSMS test [id number].         If you have any questions or concerns, please call the clinic at the number listed above.       Sincerely,        LALI HART MD

## 2018-12-13 NOTE — PATIENT INSTRUCTIONS
(I10) Hypertension goal BP (blood pressure) < 140/90  (primary encounter diagnosis)  Comment:    Plan: Lipid panel reflex to direct LDL Fasting,         Albumin Random Urine Quantitative with Creat         Ratio, amLODIPine (NORVASC) 2.5 MG tablet         B    (K21.9) Gastroesophageal reflux disease without esophagitis  Comment:    Plan: CBC with platelets             (R20.0) Numbness in feet  Comment:    Plan:      (K92.1) Blood in stool  Comment:    Plan: GASTROENTEROLOGY ADULT REF PROCEDURE ONLY             (K64.1) Grade II hemorrhoids  Comment:    Plan: hydrocortisone (CORTAID) 1 % cream             (Z83.3) Family history of diabetes mellitus  Comment:    Plan: CANCELED: Hemoglobin A1c              (E55.9) Vitamin D deficiency  Comment:            Plan: Vitamin D Deficiency            LALI HART JR., MD         '

## 2018-12-14 LAB
CHOLEST SERPL-MCNC: 187 MG/DL
CREAT UR-MCNC: 258 MG/DL
DEPRECATED CALCIDIOL+CALCIFEROL SERPL-MC: 66 UG/L (ref 20–75)
HDLC SERPL-MCNC: 49 MG/DL
LDLC SERPL CALC-MCNC: 102 MG/DL
MICROALBUMIN UR-MCNC: 66 MG/L
MICROALBUMIN/CREAT UR: 25.54 MG/G CR (ref 0–17)
NONHDLC SERPL-MCNC: 138 MG/DL
TRIGL SERPL-MCNC: 178 MG/DL

## 2018-12-17 ENCOUNTER — PATIENT OUTREACH (OUTPATIENT)
Dept: GERIATRIC MEDICINE | Facility: CLINIC | Age: 78
End: 2018-12-17

## 2018-12-17 NOTE — PROGRESS NOTES
[facility] Care Coordination Contact    Received after visit chart from care coordinator.  Completed following tasks: Mailed copy of care plan to client, Entered MMIS and faxed JANE to [facility]  Chart was returned to CC.     Karlee New  Care Management Specialist Supervisor  [facility]  [phone]

## 2018-12-17 NOTE — LETTER
December 17, 2018    ALEXUS DAVIS  [address]   [place] [address]     Dear Alexus,    At [facility], we re dedicated to improving the health and well-being of our members.  Enclosed you will find the Comprehensive Care Plan that was developed with you on 12/06/2018. Please review the Care Plan carefully.    As a reminder, some of the things we discussed at your visit include:    Ways to improve or maintain your physical health such as walking 20 minutes a day.     Ways to reduce the risk of falls.     Health care needs you may have.     Don t forget to contact your care coordinator if you:    Have been hospitalized or plan to be hospitalized.     Have experienced a fall.      Have experienced a change in physical health, which may include bladder control and pain issues.      Are experiencing emotional problems.     If you do not agree with your Care Plan, have questions about it, or have experienced a change in your needs, please contact me, your care coordinator, at [phone]. If you are hearing impaired, please call the [place] [employer] at 734 or [phone] (piichc-ls-qvmxun relay service).    Sincerely,      Rafiq Davila RN, N  [facility]     MSC+ [phone]                                        (12/16)

## 2019-02-12 DIAGNOSIS — G47.00 INSOMNIA, UNSPECIFIED TYPE: Primary | ICD-10-CM

## 2019-02-12 NOTE — TELEPHONE ENCOUNTER
"Requested Prescriptions   Pending Prescriptions Disp Refills     traZODone (DESYREL) 50 MG tablet [Pharmacy Med Name: TRAZODONE 50 MG TABLET] 60 tablet 11      Last Written Prescription Date:  8/11/17  Last Fill Quantity: 60,  # refills: 11   Last office visit: 12/13/2018 with prescribing provider:     Future Office Visit:     Sig: TAKE 2 TABLETS BY MOUTH AT BEDTIME AS NEEDED FOR SLEEP    Serotonin Modulators Failed - 2/12/2019  1:16 AM       Failed - Medication is active on med list       Passed - Recent (12 mo) or future (30 days) visit within the authorizing provider's specialty    Patient had office visit in the last 12 months or has a visit in the next 30 days with authorizing provider or within the authorizing provider's specialty.  See \"Patient Info\" tab in inbasket, or \"Choose Columns\" in Meds & Orders section of the refill encounter.             Passed - Patient is age 18 or older          Routing refill request to provider for review/approval because:  Drug not active on patient's medication list    "

## 2019-02-15 RX ORDER — TRAZODONE HYDROCHLORIDE 50 MG/1
TABLET, FILM COATED ORAL
Qty: 60 TABLET | Refills: 11 | Status: SHIPPED | OUTPATIENT
Start: 2019-02-15 | End: 2019-09-17

## 2019-03-11 ENCOUNTER — PATIENT OUTREACH (OUTPATIENT)
Dept: GERIATRIC MEDICINE | Facility: CLINIC | Age: 79
End: 2019-03-11

## 2019-03-11 ENCOUNTER — HOSPITAL ENCOUNTER (EMERGENCY)
Facility: CLINIC | Age: 79
Discharge: HOME | End: 2019-03-11
Attending: EMERGENCY MEDICINE | Admitting: EMERGENCY MEDICINE
Payer: MEDICARE

## 2019-03-11 VITALS
BODY MASS INDEX: 22.76 KG/M2 | HEIGHT: 67 IN | SYSTOLIC BLOOD PRESSURE: 177 MMHG | RESPIRATION RATE: 18 BRPM | WEIGHT: 145 LBS | DIASTOLIC BLOOD PRESSURE: 79 MMHG | TEMPERATURE: 97.6 F | OXYGEN SATURATION: 96 %

## 2019-03-11 DIAGNOSIS — K64.4 RESIDUAL HEMORRHOIDAL SKIN TAGS: ICD-10-CM

## 2019-03-11 DIAGNOSIS — K64.4 EXTERNAL HEMORRHOIDS: ICD-10-CM

## 2019-03-11 PROCEDURE — 99283 EMERGENCY DEPT VISIT LOW MDM: CPT

## 2019-03-11 ASSESSMENT — ENCOUNTER SYMPTOMS
ANAL BLEEDING: 1
ABDOMINAL PAIN: 0
RECTAL PAIN: 0

## 2019-03-11 ASSESSMENT — MIFFLIN-ST. JEOR: SCORE: 1331.35

## 2019-03-11 NOTE — PROGRESS NOTES
[facility] Care Coordination Contact  CC received notification of Emergency Room visit.  ER visit occurred on 3/11/19 at [facility] with Dx of rectal bleeding secondary to hemorrhoids.    CC contacted member and reviewed discharge summary.  Member has a follow-up appointment with PCP: No: Offered Assistance with setting up a follow up appointment  Member has had a change in condition: No  New referrals placed: No  Home Visit Needed: No  Care plan reviewed and updated.  PCP notified of ED visit via EMR.    Rafiq Davila RN BSN PHN  [facility] Care Coordinator  [phone]  Fax:[phone]

## 2019-03-11 NOTE — ED AVS SNAPSHOT
Emergency Department  [address] [facility] [address]  Phone:  [phone]  Fax:  [phone]                                    Lazaro Paulson   MRN: [id number]    Department:   Emergency Department   Date of Visit:  3/11/2019           After Visit Summary Signature Page    I have received my discharge instructions, and my questions have been answered. I have discussed any challenges I see with this plan with the nurse or doctor.    ..........................................................................................................................................  Patient/Patient Representative Signature      ..........................................................................................................................................  Patient Representative Print Name and Relationship to Patient    ..................................................               ................................................  Date                                   Time    ..........................................................................................................................................  Reviewed by Signature/Title    ...................................................              ..............................................  Date                                               Time          22EPIC Rev 08/18

## 2019-03-11 NOTE — ED PROVIDER NOTES
"  History     Chief Complaint:  Rectal bleeding      HPI   Lazaro Paulson is a 79 year old male who presents with rectal bleeding. The patient states that yesterday he noticed bright red blood with his bowel movement. He states that the blood is separate from the stool. The patient has had 4 episodes since yesterday and notes that he has been having to strain more to have his bowel movements. He did have one episode of rectal bleeding a few months ago that resolved on its own and he was not seen at that time. The patient denies any abdominal pain or rectal pain.     Allergies:  No known drug allergies.     Medications:    Norvasc  Aspirin   Atenolol   Neurontin   Omeprazole   Seroquel   Desyrel     Past Medical History:    Hypertension    Past Surgical History:    Right Hand surgery   Orthopedic Surgery   Right phacoemulsification clear cornea    Family History:    Genetic syndrome-Mother, Father     Social History:  Marital Status: Single   Presents to the ED with family  Tobacco Use: Former Smoker  Alcohol Use: No  PCP: LALI HART      Review of Systems   Gastrointestinal: Positive for anal bleeding. Negative for abdominal pain and rectal pain.   All other systems reviewed and are negative.      Physical Exam   First Vitals:  BP: 177/79  Heart Rate: 70  Temp: 97.6  F (36.4  C)  Resp: 16  Height: 170.2 cm (5' 7\")  Weight: 65.8 kg (145 lb)  SpO2: 96 %      Physical Exam  Eye:  Pupils are equal, round, and reactive.  Extraocular movements intact.    ENT:  No rhinorrhea.  Moist mucus membranes.  Normal tongue and tonsil.    Cardiac:  Regular rate and rhythm.  No murmurs, gallops, or rubs.    Pulmonary:  Clear to auscultation bilaterally.  No wheezes, rales, or rhonchi.    Abdomen:  Positive bowel sounds.  Abdomen is soft and non-distended, without focal tenderness. Rectal exam shows an old skintag from prior hemorrhoid. There is an area of friable bleeding tissue at the proximal portion of this tag without " tenderness.       Musculoskeletal:  Normal movement of all extremities without evidence for deficit.    Skin:  Warm and dry without rashes.    Neurologic:  Non-focal exam without asymmetric weakness or numbness.     Psychiatric:  Normal affect with appropriate interaction with examiner.     Emergency Department Course   Emergency Department Course:  Nursing notes and vitals reviewed.  (0317) I performed an exam of the patient as documented above.    Findings and plan explained to the patient. Patient discharged home with instructions regarding supportive care, medications, and reasons to return. The importance of close follow-up was reviewed.      Impression & Plan    Medical Decision Making:  This delightful 79-year-old I[place]ian gentleman presents with concerns of having some bright red blood when he has used the bathroom today.  He noted having normal stools followed by having bright red blood.  He did not suffer any melena or blood mixed in with his stool.  He does have a history of hemorrhoid in the distant past and that he did have some bright red bleeding approximately 4 months ago which resolved on its own.  He denies any associated pain or fever.  On exam, he clearly shows evidence of a residual hemorrhoidal skin tag and then there is a friable area of mild bleeding to the proximal portion of this stalk.  With this, I believe that this is all related to hemorrhoidal bleeding rather than other intra-abdominal pathologies.  He otherwise has normal vital signs and has a soft and benign abdomen.  I do not believe that he requires blood work, advanced imaging, or other intervention at this time.  I advised over-the-counter medications for this condition and to otherwise follow-up with his primary doctor if he does not have complete resolution of bleeding within the week.  He will return to us for any worsening of condition or other emergent concerns.      Diagnosis:    ICD-10-CM    1. External hemorrhoids K64.4     2. Residual hemorrhoidal skin tags K64.4        Disposition:  discharged to home        I, Rachel Calvo, am serving as a scribe on 3/11/2019 at 3:17 AM to personally document services performed by Dr. Trierweiler based on my observations and the provider's statements to me.    3/11/2019    EMERGENCY DEPARTMENT       Trierweiler, Chad A, MD  03/11/19 0456

## 2019-03-12 ENCOUNTER — TELEPHONE (OUTPATIENT)
Dept: FAMILY MEDICINE | Facility: CLINIC | Age: 79
End: 2019-03-12

## 2019-03-12 NOTE — TELEPHONE ENCOUNTER
Call received from patient this AM stating he needs an appointment with provider.     States he was in the ED and is having bleeding in his stool.     Talked with him about black/bloody stools more than once or if no improvement then will need to call the clinic back.     He will continue to monitor and update the clinic as needed.

## 2019-03-15 ENCOUNTER — OFFICE VISIT (OUTPATIENT)
Dept: FAMILY MEDICINE | Facility: CLINIC | Age: 79
End: 2019-03-15
Payer: MEDICARE

## 2019-03-15 ENCOUNTER — TELEPHONE (OUTPATIENT)
Dept: FAMILY MEDICINE | Facility: CLINIC | Age: 79
End: 2019-03-15

## 2019-03-15 VITALS
OXYGEN SATURATION: 100 % | TEMPERATURE: 97.9 F | WEIGHT: 149 LBS | RESPIRATION RATE: 16 BRPM | SYSTOLIC BLOOD PRESSURE: 138 MMHG | DIASTOLIC BLOOD PRESSURE: 76 MMHG | HEART RATE: 60 BPM | BODY MASS INDEX: 23.34 KG/M2

## 2019-03-15 DIAGNOSIS — M19.042 PRIMARY OSTEOARTHRITIS OF LEFT HAND: ICD-10-CM

## 2019-03-15 DIAGNOSIS — G62.9 PERIPHERAL POLYNEUROPATHY: ICD-10-CM

## 2019-03-15 DIAGNOSIS — K21.9 GASTROESOPHAGEAL REFLUX DISEASE WITHOUT ESOPHAGITIS: ICD-10-CM

## 2019-03-15 DIAGNOSIS — I10 ESSENTIAL HYPERTENSION WITH GOAL BLOOD PRESSURE LESS THAN 140/90: ICD-10-CM

## 2019-03-15 DIAGNOSIS — E55.9 VITAMIN D DEFICIENCY DISEASE: ICD-10-CM

## 2019-03-15 DIAGNOSIS — E78.2 MIXED HYPERLIPIDEMIA: ICD-10-CM

## 2019-03-15 DIAGNOSIS — I10 HYPERTENSION GOAL BP (BLOOD PRESSURE) < 140/90: ICD-10-CM

## 2019-03-15 DIAGNOSIS — K64.8 BLEEDING INTERNAL HEMORRHOIDS: Primary | ICD-10-CM

## 2019-03-15 PROCEDURE — 99214 OFFICE O/P EST MOD 30 MIN: CPT | Performed by: FAMILY MEDICINE

## 2019-03-15 RX ORDER — ROSUVASTATIN CALCIUM 5 MG/1
5 TABLET, COATED ORAL DAILY
Qty: 90 TABLET | Refills: 3 | Status: SHIPPED | OUTPATIENT
Start: 2019-03-15 | End: 2019-03-16

## 2019-03-15 RX ORDER — NICOTINE POLACRILEX 4 MG/1
20 GUM, CHEWING ORAL DAILY
Qty: 90 TABLET | Refills: 3 | Status: SHIPPED | OUTPATIENT
Start: 2019-03-15 | End: 2019-09-17

## 2019-03-15 RX ORDER — CALCIUM POLYCARBOPHIL 625 MG 625 MG/1
2 TABLET ORAL DAILY
Qty: 60 TABLET | Refills: 11 | Status: SHIPPED | OUTPATIENT
Start: 2019-03-15 | End: 2019-09-17

## 2019-03-15 RX ORDER — HYDROCORTISONE ACETATE 25 MG/1
25 SUPPOSITORY RECTAL 2 TIMES DAILY
Qty: 30 SUPPOSITORY | Refills: 1 | Status: SHIPPED | OUTPATIENT
Start: 2019-03-15 | End: 2019-09-17

## 2019-03-15 RX ORDER — GABAPENTIN 100 MG/1
100 CAPSULE ORAL 3 TIMES DAILY
Qty: 60 CAPSULE | Refills: 11 | Status: SHIPPED | OUTPATIENT
Start: 2019-03-15 | End: 2019-09-17

## 2019-03-15 RX ORDER — AMLODIPINE BESYLATE 2.5 MG/1
2.5 TABLET ORAL DAILY
Qty: 90 TABLET | Refills: 3 | Status: SHIPPED | OUTPATIENT
Start: 2019-03-15 | End: 2019-09-17

## 2019-03-15 RX ORDER — TROLAMINE SALICYLATE 10 G/100G
CREAM TOPICAL PRN
Qty: 100 G | Refills: 11 | Status: SHIPPED | OUTPATIENT
Start: 2019-03-15 | End: 2019-09-17

## 2019-03-15 RX ORDER — ATENOLOL 50 MG/1
50 TABLET ORAL DAILY
Qty: 30 TABLET | Refills: 11 | Status: SHIPPED | OUTPATIENT
Start: 2019-03-15 | End: 2019-09-17

## 2019-03-15 NOTE — PROGRESS NOTES
Due to language barrier, an  was present during the history-taking and subsequent discussion (and for part of the physical exam) with this patient.
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
## 2019-03-15 NOTE — TELEPHONE ENCOUNTER
Called pharmacy with provider message to take Seroquel off patient medication list.       
Bladder non-tender and non-distended. Urine clear yellow. Voiding normally.

## 2019-03-15 NOTE — PATIENT INSTRUCTIONS
(K64.8) Bleeding internal hemorrhoids  (primary encounter diagnosis)    Comment:      Plan: hydrocortisone (ANUSOL-HC) 25 MG suppository,           calcium polycarbophil (FIBERCON) 625 MG tablet,          GASTROENTEROLOGY ADULT REF PROCEDURE ONLY           [facility] [phone]; Colorectal           Surgery                   (I10) Hypertension goal BP (blood pressure) < 140/90    Comment:      Plan: amLODIPine (NORVASC) 2.5 MG tablet                   (E55.9) Vitamin D deficiency disease    Comment:      Plan: vitamin D3 (CHOLECALCIFEROL) 1000 units (25           mcg) tablet                   (K21.9) Gastroesophageal reflux disease without esophagitis    Comment:      Plan: omeprazole 20 MG tablet                   (G62.9) Peripheral polyneuropathy    Comment:      Plan: gabapentin (NEURONTIN) 100 MG capsule                   (I10) Essential hypertension with goal blood pressure less than 140/90    Comment:      Plan: atenolol (TENORMIN) 50 MG tablet                   (E78.2) Mixed hyperlipidemia    Comment:      Plan: rosuvastatin (CRESTOR) 5 MG tablet              The 10-year ASCVD risk score (Beverlyjan AGUSTIN Jr., et al., 2013) is: 27.6%      Values used to calculate the score:        Age: 79 years        Sex: Male        Is Non- : Yes        Diabetic: No        Tobacco smoker: No        Systolic Blood Pressure: 138 mmHg        Is BP treated: Yes        HDL Cholesterol: 49 mg/dL        Total Cholesterol: 187 mg/dL

## 2019-03-16 ENCOUNTER — TELEPHONE (OUTPATIENT)
Dept: FAMILY MEDICINE | Facility: CLINIC | Age: 79
End: 2019-03-16

## 2019-03-16 DIAGNOSIS — E78.5 HYPERLIPIDEMIA LDL GOAL <100: Primary | ICD-10-CM

## 2019-03-16 RX ORDER — ATORVASTATIN CALCIUM 10 MG/1
10 TABLET, FILM COATED ORAL DAILY
Qty: 90 TABLET | Refills: 3 | Status: SHIPPED | OUTPATIENT
Start: 2019-03-16 | End: 2019-09-17

## 2019-03-16 NOTE — TELEPHONE ENCOUNTER
PHARMACY COMMENTS: rosuvastatin (CRESTOR) 5 MG tablet NOT COVERED--- PREFERRED ALTS; LOVASTATIN OR SIMVASTATIN OR ATROVASTATIN OR PRAVASTATIN

## 2019-05-28 ENCOUNTER — PATIENT OUTREACH (OUTPATIENT)
Dept: GERIATRIC MEDICINE | Facility: CLINIC | Age: 79
End: 2019-05-28

## 2019-05-28 ENCOUNTER — APPOINTMENT (OUTPATIENT)
Dept: CT IMAGING | Facility: CLINIC | Age: 79
End: 2019-05-28
Attending: NURSE PRACTITIONER

## 2019-05-28 ENCOUNTER — HOSPITAL ENCOUNTER (EMERGENCY)
Facility: CLINIC | Age: 79
Discharge: HOME | End: 2019-05-28
Attending: NURSE PRACTITIONER | Admitting: NURSE PRACTITIONER

## 2019-05-28 ENCOUNTER — APPOINTMENT (OUTPATIENT)
Dept: GENERAL RADIOLOGY | Facility: CLINIC | Age: 79
End: 2019-05-28
Attending: NURSE PRACTITIONER

## 2019-05-28 VITALS
DIASTOLIC BLOOD PRESSURE: 82 MMHG | OXYGEN SATURATION: 100 % | HEART RATE: 60 BPM | RESPIRATION RATE: 16 BRPM | TEMPERATURE: 97.8 F | SYSTOLIC BLOOD PRESSURE: 165 MMHG

## 2019-05-28 DIAGNOSIS — S42.009A CLAVICLE FRACTURE: ICD-10-CM
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
## 2019-05-28 LAB
ANION GAP SERPL CALCULATED.3IONS-SCNC: 7 MMOL/L (ref 3–14)
BASOPHILS # BLD AUTO: 0 10E9/L (ref 0–0.2)
BASOPHILS NFR BLD AUTO: 0.4 %
BUN SERPL-MCNC: 15 MG/DL (ref 7–30)
CALCIUM SERPL-MCNC: 9.2 MG/DL (ref 8.5–10.1)
CHLORIDE SERPL-SCNC: 104 MMOL/L (ref 94–109)
CO2 SERPL-SCNC: 28 MMOL/L (ref 20–32)
CREAT SERPL-MCNC: 1.05 MG/DL (ref 0.66–1.25)
DIFFERENTIAL METHOD BLD: NORMAL
EOSINOPHIL # BLD AUTO: 0.4 10E9/L (ref 0–0.7)
EOSINOPHIL NFR BLD AUTO: 4.4 %
ERYTHROCYTE [DISTWIDTH] IN BLOOD BY AUTOMATED COUNT: 12.2 % (ref 10–15)
GFR SERPL CREATININE-BSD FRML MDRD: 67 ML/MIN/{1.73_M2}
GLUCOSE SERPL-MCNC: 116 MG/DL (ref 70–99)
HCT VFR BLD AUTO: 43.2 % (ref 40–53)
HGB BLD-MCNC: 15.2 G/DL (ref 13.3–17.7)
IMM GRANULOCYTES # BLD: 0 10E9/L (ref 0–0.4)
IMM GRANULOCYTES NFR BLD: 0.2 %
LYMPHOCYTES # BLD AUTO: 1.9 10E9/L (ref 0.8–5.3)
LYMPHOCYTES NFR BLD AUTO: 20.3 %
MCH RBC QN AUTO: 31.4 PG (ref 26.5–33)
MCHC RBC AUTO-ENTMCNC: 35.2 G/DL (ref 31.5–36.5)
MCV RBC AUTO: 89 FL (ref 78–100)
MONOCYTES # BLD AUTO: 0.6 10E9/L (ref 0–1.3)
MONOCYTES NFR BLD AUTO: 6.3 %
NEUTROPHILS # BLD AUTO: 6.4 10E9/L (ref 1.6–8.3)
NEUTROPHILS NFR BLD AUTO: 68.4 %
NRBC # BLD AUTO: 0 10*3/UL
NRBC BLD AUTO-RTO: 0 /100
PLATELET # BLD AUTO: 255 10E9/L (ref 150–450)
POTASSIUM SERPL-SCNC: 3.5 MMOL/L (ref 3.4–5.3)
RBC # BLD AUTO: 4.84 10E12/L (ref 4.4–5.9)
SODIUM SERPL-SCNC: 139 MMOL/L (ref 133–144)
WBC # BLD AUTO: 9.3 10E9/L (ref 4–11)

## 2019-05-28 PROCEDURE — 99285 EMERGENCY DEPT VISIT HI MDM: CPT | Mod: 25

## 2019-05-28 PROCEDURE — 71046 X-RAY EXAM CHEST 2 VIEWS: CPT

## 2019-05-28 PROCEDURE — 23500 CLTX CLAVICULAR FX W/O MNPJ: CPT | Mod: LT

## 2019-05-28 PROCEDURE — 74177 CT ABD & PELVIS W/CONTRAST: CPT

## 2019-05-28 PROCEDURE — 71260 CT THORAX DX C+: CPT

## 2019-05-28 PROCEDURE — 80048 BASIC METABOLIC PNL TOTAL CA: CPT | Performed by: NURSE PRACTITIONER

## 2019-05-28 PROCEDURE — 25000128 H RX IP 250 OP 636: Performed by: NURSE PRACTITIONER

## 2019-05-28 PROCEDURE — 25000132 ZZH RX MED GY IP 250 OP 250 PS 637: Performed by: NURSE PRACTITIONER

## 2019-05-28 PROCEDURE — 85025 COMPLETE CBC W/AUTO DIFF WBC: CPT | Performed by: NURSE PRACTITIONER

## 2019-05-28 PROCEDURE — 73000 X-RAY EXAM OF COLLAR BONE: CPT | Mod: LT

## 2019-05-28 PROCEDURE — 72131 CT LUMBAR SPINE W/O DYE: CPT

## 2019-05-28 PROCEDURE — 96374 THER/PROPH/DIAG INJ IV PUSH: CPT

## 2019-05-28 PROCEDURE — 25000125 ZZHC RX 250: Performed by: NURSE PRACTITIONER

## 2019-05-28 PROCEDURE — 96375 TX/PRO/DX INJ NEW DRUG ADDON: CPT

## 2019-05-28 RX ORDER — HYDROCODONE BITARTRATE AND ACETAMINOPHEN 5; 325 MG/1; MG/1
1 TABLET ORAL EVERY 6 HOURS PRN
Qty: 10 TABLET | Refills: 0 | Status: SHIPPED | OUTPATIENT
Start: 2019-05-28 | End: 2020-05-20

## 2019-05-28 RX ORDER — IOPAMIDOL 755 MG/ML
75 INJECTION, SOLUTION INTRAVASCULAR ONCE
Status: COMPLETED | OUTPATIENT
Start: 2019-05-28 | End: 2019-05-28

## 2019-05-28 RX ORDER — DOCUSATE SODIUM 100 MG/1
100 CAPSULE, LIQUID FILLED ORAL 2 TIMES DAILY
Qty: 30 CAPSULE | Refills: 0 | Status: SHIPPED | OUTPATIENT
Start: 2019-05-28 | End: 2020-05-22

## 2019-05-28 RX ORDER — ONDANSETRON 2 MG/ML
4 INJECTION INTRAMUSCULAR; INTRAVENOUS EVERY 30 MIN PRN
Status: DISCONTINUED | OUTPATIENT
Start: 2019-05-28 | End: 2019-05-28 | Stop reason: HOSPADM

## 2019-05-28 RX ORDER — HYDROMORPHONE HYDROCHLORIDE 1 MG/ML
0.5 INJECTION, SOLUTION INTRAMUSCULAR; INTRAVENOUS; SUBCUTANEOUS
Status: DISCONTINUED | OUTPATIENT
Start: 2019-05-28 | End: 2019-05-28 | Stop reason: HOSPADM

## 2019-05-28 RX ORDER — HYDROCODONE BITARTRATE AND ACETAMINOPHEN 5; 325 MG/1; MG/1
1 TABLET ORAL ONCE
Status: COMPLETED | OUTPATIENT
Start: 2019-05-28 | End: 2019-05-28

## 2019-05-28 RX ADMIN — SODIUM CHLORIDE 63 ML: 9 INJECTION, SOLUTION INTRAVENOUS at 15:01

## 2019-05-28 RX ADMIN — HYDROMORPHONE HYDROCHLORIDE 0.5 MG: 1 INJECTION, SOLUTION INTRAMUSCULAR; INTRAVENOUS; SUBCUTANEOUS at 13:30

## 2019-05-28 RX ADMIN — IOPAMIDOL 75 ML: 755 INJECTION, SOLUTION INTRAVENOUS at 15:01

## 2019-05-28 RX ADMIN — HYDROCODONE BITARTRATE AND ACETAMINOPHEN 1 TABLET: 5; 325 TABLET ORAL at 16:44

## 2019-05-28 RX ADMIN — ONDANSETRON 4 MG: 2 INJECTION INTRAMUSCULAR; INTRAVENOUS at 13:29

## 2019-05-28 ASSESSMENT — ENCOUNTER SYMPTOMS
ABDOMINAL PAIN: 0
BACK PAIN: 0
NECK PAIN: 0
NECK STIFFNESS: 0
ARTHRALGIAS: 1
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
## 2019-05-28 NOTE — ED AVS SNAPSHOT
Emergency Department  [address] [facility] [address]  Phone:  [phone]  Fax:  [phone]                                    Lazaro Paulson   MRN: [id number]    Department:   Emergency Department   Date of Visit:  5/28/2019           After Visit Summary Signature Page    I have received my discharge instructions, and my questions have been answered. I have discussed any challenges I see with this plan with the nurse or doctor.    ..........................................................................................................................................  Patient/Patient Representative Signature      ..........................................................................................................................................  Patient Representative Print Name and Relationship to Patient    ..................................................               ................................................  Date                                   Time    ..........................................................................................................................................  Reviewed by Signature/Title    ...................................................              ..............................................  Date                                               Time          22EPIC Rev 08/18
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
## 2019-05-30 DIAGNOSIS — I10 ESSENTIAL HYPERTENSION WITH GOAL BLOOD PRESSURE LESS THAN 140/90: ICD-10-CM

## 2019-05-30 NOTE — TELEPHONE ENCOUNTER
"Requested Prescriptions   Pending Prescriptions Disp Refills     aspirin (ASA) 81 MG chewable tablet [Pharmacy Med Name: ASPIRIN 81 MG CHEWABLE TABLET]  Last Written Prescription Date:  8/10/2018  Last Fill Quantity: 100 tablet,  # refills: 3   Last office visit: 3/15/2019 with prescribing provider:  TOMY Feliciano   Future Office Visit:     100 tablet 2     Sig: CHEW 1 TABLET DAILY AS NEEDED       Analgesics (Non-Narcotic Tylenol and ASA Only) Passed - 5/30/2019  1:14 AM        Passed - Recent (12 mo) or future (30 days) visit within the authorizing provider's specialty     Patient had office visit in the last 12 months or has a visit in the next 30 days with authorizing provider or within the authorizing provider's specialty.  See \"Patient Info\" tab in inbasket, or \"Choose Columns\" in Meds & Orders section of the refill encounter.              Passed - Patient is age 20 years or older     If ASA is flagged for ages under 20 years old. Forward to provider for confirmation Ryes Syndrome is not a concern.              Passed - Medication is active on med list           "

## 2019-05-31 RX ORDER — ASPIRIN 81 MG/1
TABLET, CHEWABLE ORAL
Qty: 100 TABLET | Refills: 2 | Status: SHIPPED | OUTPATIENT
Start: 2019-05-31 | End: 2019-09-17

## 2019-06-19 ENCOUNTER — PATIENT OUTREACH (OUTPATIENT)
Dept: GERIATRIC MEDICINE | Facility: CLINIC | Age: 79
End: 2019-06-19
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
## 2019-06-25 DIAGNOSIS — F51.01 PRIMARY INSOMNIA: Primary | ICD-10-CM

## 2019-06-25 DIAGNOSIS — G47.00 INSOMNIA, UNSPECIFIED TYPE: ICD-10-CM

## 2019-06-25 NOTE — TELEPHONE ENCOUNTER
Requested Prescriptions   Pending Prescriptions Disp Refills     QUEtiapine (SEROQUEL) 50 MG tablet  DISCONTINUED  Last Written Prescription Date:  8/10/2018 END: 3/15/2019  Last Fill Quantity: 30 tablet,  # refills: 11   Last office visit: 3/15/2019 with prescribing provider:  TOMY Feliciano   Future Office Visit:           Sig: Take 1 tablet (50 mg) by mouth 2 times daily       Antipsychotic Medications Failed - 6/25/2019  3:01 PM        Failed - Blood pressure under 140/90 in past 12 months     BP Readings from Last 3 Encounters:   05/28/19 165/82   03/15/19 138/76   03/11/19 177/79                 Failed - Medication is active on med list        Passed - Patient is 12 years of age or older        Passed - Lipid panel on file within the past 12 months     Recent Labs   Lab Test 12/13/18  1511  08/25/15  0919   CHOL 187   < > 169   TRIG 178*   < > 246*   HDL 49   < > 45   *   < > 75   NHDL 138*   < >  --    VLDL  --   --  49*   CHOLHDLRATIO  --   --  3.8    < > = values in this interval not displayed.               Passed - CBC on file in past 12 months     Recent Labs   Lab Test 05/28/19  1333   WBC 9.3   RBC 4.84   HGB 15.2   HCT 43.2                    Passed - Heart Rate on file within past 12 months     Pulse Readings from Last 3 Encounters:   05/28/19 60   03/15/19 60   12/13/18 61               Passed - A1c or Glucose on file in past 12 months     Recent Labs   Lab Test 05/28/19  1333 08/10/18  1254   * 84   A1C  --  5.1       Please review patients last 3 weights. If a weight gain of >10 lbs exists, you may refill the prescription once after instructing the patient to schedule an appointment within the next 30 days.    Wt Readings from Last 3 Encounters:   03/15/19 67.6 kg (149 lb)   03/11/19 65.8 kg (145 lb)   12/13/18 66.7 kg (147 lb)             Passed - Recent (6 mo) or future (30 days) visit within the authorizing provider's specialty     Patient had office visit in the last 6 months  "or has a visit in the next 30 days with authorizing provider or within the authorizing provider's specialty.  See \"Patient Info\" tab in inbasket, or \"Choose Columns\" in Meds & Orders section of the refill encounter.               "

## 2019-06-26 NOTE — TELEPHONE ENCOUNTER
Routing refill request to provider for review/approval because:  Med not listed as active medication  BP out of range.

## 2019-06-27 RX ORDER — QUETIAPINE FUMARATE 50 MG/1
50 TABLET, FILM COATED ORAL 2 TIMES DAILY
Qty: 15 TABLET | Refills: 0 | Status: SHIPPED | OUTPATIENT
Start: 2019-06-27 | End: 2019-09-17

## 2019-08-19 DIAGNOSIS — K21.9 GASTROESOPHAGEAL REFLUX DISEASE WITHOUT ESOPHAGITIS: ICD-10-CM

## 2019-08-19 NOTE — TELEPHONE ENCOUNTER
"Requested Prescriptions   Pending Prescriptions Disp Refills     omeprazole (PRILOSEC) 20 MG DR capsule [Pharmacy Med Name: OMEPRAZOLE DR 20 MG CAPSULE] 90 capsule 3     Sig: TAKE 1 CAPSULE BY MOUTH EVERY DAY     Last Written Prescription Date:  3/15/19  Last Fill Quantity: 90,  # refills: 3   Last office visit: 3/15/2019 with prescribing provider:     Future Office Visit:        PPI Protocol Passed - 8/19/2019  1:06 AM        Passed - Not on Clopidogrel (unless Pantoprazole ordered)        Passed - No diagnosis of osteoporosis on record        Passed - Recent (12 mo) or future (30 days) visit within the authorizing provider's specialty     Patient had office visit in the last 12 months or has a visit in the next 30 days with authorizing provider or within the authorizing provider's specialty.  See \"Patient Info\" tab in inbasket, or \"Choose Columns\" in Meds & Orders section of the refill encounter.              Passed - Medication is active on med list        Passed - Patient is age 18 or older          "

## 2019-09-17 ENCOUNTER — OFFICE VISIT (OUTPATIENT)
Dept: FAMILY MEDICINE | Facility: CLINIC | Age: 79
End: 2019-09-17
Payer: MEDICARE

## 2019-09-17 VITALS
DIASTOLIC BLOOD PRESSURE: 70 MMHG | OXYGEN SATURATION: 97 % | RESPIRATION RATE: 16 BRPM | BODY MASS INDEX: 23.34 KG/M2 | WEIGHT: 149 LBS | HEART RATE: 61 BPM | SYSTOLIC BLOOD PRESSURE: 130 MMHG

## 2019-09-17 DIAGNOSIS — Z00.00 WELL ADULT EXAM: Primary | ICD-10-CM

## 2019-09-17 DIAGNOSIS — F51.01 PRIMARY INSOMNIA: ICD-10-CM

## 2019-09-17 DIAGNOSIS — E55.9 VITAMIN D DEFICIENCY DISEASE: ICD-10-CM

## 2019-09-17 DIAGNOSIS — I10 ESSENTIAL HYPERTENSION WITH GOAL BLOOD PRESSURE LESS THAN 140/90: ICD-10-CM

## 2019-09-17 DIAGNOSIS — I10 HYPERTENSION GOAL BP (BLOOD PRESSURE) < 140/90: ICD-10-CM

## 2019-09-17 DIAGNOSIS — G62.9 PERIPHERAL POLYNEUROPATHY: ICD-10-CM

## 2019-09-17 DIAGNOSIS — E78.2 MIXED HYPERLIPIDEMIA: ICD-10-CM

## 2019-09-17 DIAGNOSIS — K64.8 BLEEDING INTERNAL HEMORRHOIDS: ICD-10-CM

## 2019-09-17 DIAGNOSIS — E78.5 HYPERLIPIDEMIA LDL GOAL <100: ICD-10-CM

## 2019-09-17 DIAGNOSIS — Z12.5 SCREENING PSA (PROSTATE SPECIFIC ANTIGEN): ICD-10-CM

## 2019-09-17 DIAGNOSIS — M19.042 PRIMARY OSTEOARTHRITIS OF LEFT HAND: ICD-10-CM

## 2019-09-17 DIAGNOSIS — K21.9 GASTROESOPHAGEAL REFLUX DISEASE WITHOUT ESOPHAGITIS: ICD-10-CM

## 2019-09-17 DIAGNOSIS — G83.20: ICD-10-CM

## 2019-09-17 DIAGNOSIS — G47.00 INSOMNIA, UNSPECIFIED TYPE: ICD-10-CM

## 2019-09-17 PROCEDURE — 83721 ASSAY OF BLOOD LIPOPROTEIN: CPT | Performed by: FAMILY MEDICINE

## 2019-09-17 PROCEDURE — G0439 PPPS, SUBSEQ VISIT: HCPCS | Performed by: FAMILY MEDICINE

## 2019-09-17 PROCEDURE — 82043 UR ALBUMIN QUANTITATIVE: CPT | Performed by: FAMILY MEDICINE

## 2019-09-17 PROCEDURE — G0103 PSA SCREENING: HCPCS | Performed by: FAMILY MEDICINE

## 2019-09-17 PROCEDURE — 99207 ZZC FOOT EXAM  NO CHARGE: CPT | Mod: 25 | Performed by: FAMILY MEDICINE

## 2019-09-17 PROCEDURE — 84460 ALANINE AMINO (ALT) (SGPT): CPT | Performed by: FAMILY MEDICINE

## 2019-09-17 PROCEDURE — 80048 BASIC METABOLIC PNL TOTAL CA: CPT | Performed by: FAMILY MEDICINE

## 2019-09-17 PROCEDURE — 36415 COLL VENOUS BLD VENIPUNCTURE: CPT | Performed by: FAMILY MEDICINE

## 2019-09-17 RX ORDER — AMLODIPINE BESYLATE 2.5 MG/1
2.5 TABLET ORAL DAILY
Qty: 90 TABLET | Refills: 3 | Status: SHIPPED | OUTPATIENT
Start: 2019-09-17 | End: 2020-03-09

## 2019-09-17 RX ORDER — ATENOLOL 50 MG/1
50 TABLET ORAL DAILY
Qty: 90 TABLET | Refills: 3 | Status: SHIPPED | OUTPATIENT
Start: 2019-09-17 | End: 2020-03-31

## 2019-09-17 RX ORDER — ASPIRIN 81 MG/1
TABLET, CHEWABLE ORAL
Qty: 100 TABLET | Refills: 3 | Status: SHIPPED | OUTPATIENT
Start: 2019-09-17 | End: 2020-05-22

## 2019-09-17 RX ORDER — ATORVASTATIN CALCIUM 10 MG/1
10 TABLET, FILM COATED ORAL DAILY
Qty: 90 TABLET | Refills: 3 | Status: SHIPPED | OUTPATIENT
Start: 2019-09-17 | End: 2020-03-19

## 2019-09-17 RX ORDER — CALCIUM POLYCARBOPHIL 625 MG 625 MG/1
2 TABLET ORAL DAILY
Qty: 180 TABLET | Refills: 3 | Status: SHIPPED | OUTPATIENT
Start: 2019-09-17

## 2019-09-17 RX ORDER — HYDROCORTISONE ACETATE 25 MG/1
25 SUPPOSITORY RECTAL 2 TIMES DAILY
Qty: 30 SUPPOSITORY | Refills: 1 | Status: SHIPPED | OUTPATIENT
Start: 2019-09-17

## 2019-09-17 RX ORDER — QUETIAPINE FUMARATE 50 MG/1
50 TABLET, FILM COATED ORAL 2 TIMES DAILY
Qty: 15 TABLET | Refills: 0 | Status: SHIPPED | OUTPATIENT
Start: 2019-09-17 | End: 2020-05-22

## 2019-09-17 RX ORDER — GABAPENTIN 100 MG/1
100 CAPSULE ORAL 3 TIMES DAILY
Qty: 180 CAPSULE | Refills: 3 | Status: SHIPPED | OUTPATIENT
Start: 2019-09-17 | End: 2020-05-20

## 2019-09-17 RX ORDER — TRAZODONE HYDROCHLORIDE 50 MG/1
TABLET, FILM COATED ORAL
Qty: 60 TABLET | Refills: 11 | Status: SHIPPED | OUTPATIENT
Start: 2019-09-17 | End: 2020-05-20

## 2019-09-17 RX ORDER — TROLAMINE SALICYLATE 10 G/100G
CREAM TOPICAL PRN
Qty: 300 G | Refills: 3 | Status: SHIPPED | OUTPATIENT
Start: 2019-09-17 | End: 2020-05-22

## 2019-09-17 RX ORDER — NICOTINE POLACRILEX 4 MG/1
20 GUM, CHEWING ORAL DAILY
Qty: 90 TABLET | Refills: 3 | Status: SHIPPED | OUTPATIENT
Start: 2019-09-17 | End: 2020-06-23

## 2019-09-17 ASSESSMENT — PATIENT HEALTH QUESTIONNAIRE - PHQ9: SUM OF ALL RESPONSES TO PHQ QUESTIONS 1-9: 0

## 2019-09-17 ASSESSMENT — ACTIVITIES OF DAILY LIVING (ADL): CURRENT_FUNCTION: TELEPHONE REQUIRES ASSISTANCE
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
## 2019-09-17 NOTE — PATIENT INSTRUCTIONS
(E78.2) Mixed hyperlipidemia  (primary encounter diagnosis)  Comment:    Plan: ALT, Albumin Random Urine Quantitative with         Creat Ratio, LDL cholesterol direct             (I10) Hypertension goal BP (blood pressure) < 140/90  Comment:    Plan: Basic metabolic panel, amLODIPine (NORVASC) 2.5        MG tablet             (Z12.5) Screening PSA (prostate specific antigen)  Comment:    Plan: Prostate spec antigen screen             (E55.9) Vitamin D deficiency disease  Comment:    Plan: vitamin D3 (CHOLECALCIFEROL) 1000 units (25         mcg) tablet             (G47.00) Insomnia, unspecified type  Comment:    Plan: traZODone (DESYREL) 50 MG tablet, QUEtiapine         (SEROQUEL) 50 MG tablet             (F51.01) Primary insomnia  Comment:    Plan: QUEtiapine (SEROQUEL) 50 MG tablet             (K21.9) Gastroesophageal reflux disease without esophagitis  Comment:    Plan: omeprazole 20 MG tablet             (K64.8) Bleeding internal hemorrhoids  Comment:    Plan: hydrocortisone (ANUSOL-HC) 25 MG suppository,         calcium polycarbophil (FIBERCON) 625 MG tablet             (G62.9) Peripheral polyneuropathy  Comment:    Plan: gabapentin (NEURONTIN) 100 MG capsule             (E78.5) Hyperlipidemia LDL goal <100  Comment:     Plan: atorvastatin (LIPITOR) 10 MG tablet                 (I10) Essential hypertension with goal blood pressure less than 140/90  Comment:    Plan: atenolol (TENORMIN) 50 MG tablet, aspirin (ASA)        81 MG chewable tablet             (M19.042) Primary osteoarthritis of left hand  Comment:    Plan: trolamine salicylate (ASPERCREME) 10 % external        cream

## 2019-09-18 LAB
ALT SERPL W P-5'-P-CCNC: 21 U/L (ref 0–70)
ANION GAP SERPL CALCULATED.3IONS-SCNC: 7 MMOL/L (ref 3–14)
BUN SERPL-MCNC: 10 MG/DL (ref 7–30)
CALCIUM SERPL-MCNC: 9.2 MG/DL (ref 8.5–10.1)
CHLORIDE SERPL-SCNC: 108 MMOL/L (ref 94–109)
CO2 SERPL-SCNC: 22 MMOL/L (ref 20–32)
CREAT SERPL-MCNC: 1.02 MG/DL (ref 0.66–1.25)
CREAT UR-MCNC: 199 MG/DL
GFR SERPL CREATININE-BSD FRML MDRD: 69 ML/MIN/{1.73_M2}
GLUCOSE SERPL-MCNC: 84 MG/DL (ref 70–99)
LDLC SERPL DIRECT ASSAY-MCNC: 96 MG/DL
MICROALBUMIN UR-MCNC: 48 MG/L
MICROALBUMIN/CREAT UR: 24.27 MG/G CR (ref 0–17)
POTASSIUM SERPL-SCNC: 3.7 MMOL/L (ref 3.4–5.3)
PSA SERPL-ACNC: 1.51 UG/L (ref 0–4)
SODIUM SERPL-SCNC: 137 MMOL/L (ref 133–144)

## 2019-09-21 DIAGNOSIS — I10 ESSENTIAL HYPERTENSION WITH GOAL BLOOD PRESSURE LESS THAN 140/90: ICD-10-CM

## 2019-09-21 RX ORDER — LOSARTAN POTASSIUM 25 MG/1
25 TABLET ORAL DAILY
Qty: 30 TABLET | Refills: 11 | Status: SHIPPED | OUTPATIENT
Start: 2019-09-21 | End: 2019-10-02

## 2019-10-02 DIAGNOSIS — I10 ESSENTIAL HYPERTENSION WITH GOAL BLOOD PRESSURE LESS THAN 140/90: ICD-10-CM

## 2019-10-02 RX ORDER — LOSARTAN POTASSIUM 25 MG/1
25 TABLET ORAL DAILY
Qty: 90 TABLET | Refills: 3 | Status: SHIPPED | OUTPATIENT
Start: 2019-10-02 | End: 2020-03-09

## 2019-10-02 NOTE — TELEPHONE ENCOUNTER
Reason for Call:  Medication or medication refill:    Do you use a [facility]?  Name of the pharmacy and phone number for the current request:  [facility] [place]    Name of the medication requested: losartan    Other request: pt was suppose to start medication but not at pharmacy     Can we leave a detailed message on this number? YES    Phone number patient can be reached at: Home number on file [phone] (home)    Best Time: any    Call taken on 10/2/2019 at 3:30 PM by MICHELLE SOW

## 2019-11-07 DIAGNOSIS — I10 ESSENTIAL HYPERTENSION WITH GOAL BLOOD PRESSURE LESS THAN 140/90: ICD-10-CM

## 2019-11-07 NOTE — TELEPHONE ENCOUNTER
"Requested Prescriptions   Pending Prescriptions Disp Refills     hydrochlorothiazide (HYDRODIURIL) 12.5 MG tablet [Pharmacy Med Name: HYDROCHLOROTHIAZIDE 12.5 MG TB]    The source prescription was discontinued on 12/13/2018 by Asif Feliciano MD for the following reason: Not filled/taken by Patient.    Last Written Prescription Date:  08/10/2018 END 12/13/2018  Last Fill Quantity: 90 tablet,  # refills: 3   Last office visit: 9/17/2019 with prescribing provider:  TOMY Feliciano   Future Office Visit:     90 tablet 3     Sig: TAKE 1 TABLET (12.5 MG) BY MOUTH DAILY       Diuretics (Including Combos) Protocol Failed - 11/7/2019 12:51 AM        Failed - Medication is active on med list        Passed - Blood pressure under 140/90 in past 12 months     BP Readings from Last 3 Encounters:   09/17/19 130/70   05/28/19 165/82   03/15/19 138/76                 Passed - Recent (12 mo) or future (30 days) visit within the authorizing provider's specialty     Patient has had an office visit with the authorizing provider or a provider within the authorizing providers department within the previous 12 mos or has a future within next 30 days. See \"Patient Info\" tab in inbasket, or \"Choose Columns\" in Meds & Orders section of the refill encounter.              Passed - Patient is age 18 or older        Passed - Normal serum creatinine on file in past 12 months     Recent Labs   Lab Test 09/17/19  1538   CR 1.02              Passed - Normal serum potassium on file in past 12 months     Recent Labs   Lab Test 09/17/19  1538   POTASSIUM 3.7                    Passed - Normal serum sodium on file in past 12 months     Recent Labs   Lab Test 09/17/19  1538                    "

## 2019-11-11 RX ORDER — HYDROCHLOROTHIAZIDE 12.5 MG/1
12.5 TABLET ORAL DAILY
Qty: 90 TABLET | Refills: 3 | OUTPATIENT
Start: 2019-11-11

## 2019-11-26 ENCOUNTER — PATIENT OUTREACH (OUTPATIENT)
Dept: GERIATRIC MEDICINE | Facility: CLINIC | Age: 79
End: 2019-11-26

## 2019-11-26 NOTE — LETTER
December 5, 2019    PETEY DELGADO  [address]   [facility] [address]    Dear Petey:     Your Care Coordinator has been unable to reach you by telephone. I am writing to ask you or a family member to call me at [phone]. If you reach my voicemail, please leave a message with your daytime telephone number and a date and time that I can call you. If you are hearing impaired, please call the [place] [employer] at [phone] or [phone] ([id number] relay service).     The reason I am trying to reach you is:     [] Six (6) month check-in  [x] To schedule your annual assessment  [] Other:      Please call me as soon as you receive this letter. I look forward to speaking with you.    Sincerely,    Rafiq Davila RN, PHN    E-mail: [email or web address]  Phone: [phone]      [facility]+ [id number] [phone] ([id number])    [id number] (11/18)

## 2019-11-26 NOTE — PROGRESS NOTES
[facility] Care Coordination Contact    Called client to schedule an annual home visit, but phone kept going straight to . Called Eliz Novoa(cousin) and was informed that client will not be available for his annual visit due to being out of state. Eliz states client is expected to return at the end of December 2019.    Rafiq Davila RN BSN PHN  [facility] Care Coordinator  [phone]  Fax:[phone]

## 2019-12-06 NOTE — PROGRESS NOTES
"[facility] Care Coordination Contact    Per CC, mailed client an \"Unable to Contact\" letter.    Helen Hernandez  Case Management Specialist  [facility]  [phone]      "

## 2019-12-17 DIAGNOSIS — G47.00 INSOMNIA, UNSPECIFIED TYPE: ICD-10-CM

## 2019-12-17 RX ORDER — TRAZODONE HYDROCHLORIDE 50 MG/1
TABLET, FILM COATED ORAL
Qty: 180 TABLET | Refills: 3 | OUTPATIENT
Start: 2019-12-17

## 2019-12-17 NOTE — TELEPHONE ENCOUNTER
Denied: trazodone    Pt refill too early. Last prescribed 09/17/2019 for 60 tbas with 11 refills. Receipt confirmed by pharmacy.

## 2019-12-17 NOTE — TELEPHONE ENCOUNTER
"Requested Prescriptions   Pending Prescriptions Disp Refills     traZODone (DESYREL) 50 MG tablet [Pharmacy Med Name: TRAZODONE 50 MG TABLET]  Last Written Prescription Date:  9/17/2019  Last Fill Quantity: 60 tablet,  # refills: 11   Last office visit: 9/17/2019 with prescribing provider:  TOMY Feliciano   Future Office Visit:     180 tablet 3     Sig: TAKE 2 TABLETS BY MOUTH AT BEDTIME AS NEEDED FOR SLEEP       Serotonin Modulators Passed - 12/17/2019  5:20 AM        Passed - Recent (12 mo) or future (30 days) visit within the authorizing provider's specialty     Patient has had an office visit with the authorizing provider or a provider within the authorizing providers department within the previous 12 mos or has a future within next 30 days. See \"Patient Info\" tab in inbasket, or \"Choose Columns\" in Meds & Orders section of the refill encounter.              Passed - Medication is active on med list        Passed - Patient is age 18 or older           "

## 2019-12-26 DIAGNOSIS — G47.00 INSOMNIA, UNSPECIFIED TYPE: ICD-10-CM

## 2019-12-26 NOTE — TELEPHONE ENCOUNTER
"Requested Prescriptions   Pending Prescriptions Disp Refills     traZODone (DESYREL) 50 MG tablet [Pharmacy Med Name: TRAZODONE 50 MG TABLET]    Last Written Prescription Date:  09/17/2019  Last Fill Quantity: 60 tablet,  # refills: 11   Last office visit: 9/17/2019 with prescribing provider:  TOMY Feliciano   Future Office Visit:     180 tablet 3     Sig: TAKE 2 TABLETS BY MOUTH AT BEDTIME AS NEEDED FOR SLEEP       Serotonin Modulators Passed - 12/26/2019 11:59 AM        Passed - Recent (12 mo) or future (30 days) visit within the authorizing provider's specialty     Patient has had an office visit with the authorizing provider or a provider within the authorizing providers department within the previous 12 mos or has a future within next 30 days. See \"Patient Info\" tab in inbasket, or \"Choose Columns\" in Meds & Orders section of the refill encounter.              Passed - Medication is active on med list        Passed - Patient is age 18 or older           "

## 2019-12-27 RX ORDER — TRAZODONE HYDROCHLORIDE 50 MG/1
TABLET, FILM COATED ORAL
Qty: 180 TABLET | Refills: 3 | OUTPATIENT
Start: 2019-12-27

## 2019-12-27 NOTE — TELEPHONE ENCOUNTER
Denied: trazodone    Pt refill too early. Last prescribed 09/17/19 for 30 days with 11 refills. Receipt confirmed by pharmacy.

## 2020-01-02 DIAGNOSIS — G47.00 INSOMNIA, UNSPECIFIED TYPE: ICD-10-CM

## 2020-01-02 RX ORDER — TRAZODONE HYDROCHLORIDE 50 MG/1
TABLET, FILM COATED ORAL
Qty: 180 TABLET | Refills: 3 | OUTPATIENT
Start: 2020-01-02

## 2020-01-02 NOTE — TELEPHONE ENCOUNTER
"Requested Prescriptions   Pending Prescriptions Disp Refills     traZODone (DESYREL) 50 MG tablet [Pharmacy Med Name: TRAZODONE 50 MG TABLET] 180 tablet 3     Sig: TAKE 2 TABLETS BY MOUTH AT BEDTIME AS NEEDED FOR SLEEP       Serotonin Modulators Passed - 1/2/2020  1:14 AM        Passed - Recent (12 mo) or future (30 days) visit within the authorizing provider's specialty     Patient has had an office visit with the authorizing provider or a provider within the authorizing providers department within the previous 12 mos or has a future within next 30 days. See \"Patient Info\" tab in inbasket, or \"Choose Columns\" in Meds & Orders section of the refill encounter.              Passed - Medication is active on med list        Passed - Patient is age 18 or older        Last Written Prescription Date:  9/17/2019  Last Fill Quantity: 60,  # refills: 11   Last office visit: 9/17/2019 with prescribing provider:  9/17/2019   Future Office Visit:      "

## 2020-01-28 ENCOUNTER — PATIENT OUTREACH (OUTPATIENT)
Dept: GERIATRIC MEDICINE | Facility: CLINIC | Age: 80
End: 2020-01-28

## 2020-01-28 NOTE — PROGRESS NOTES
[facility] Care Coordination Contact    Received  vm from member stating he is back from [place] and requesting a home visit. Called member back @[phone] but no answer. Left vm requesting to return call.    Rafiq Davila RN BSN PHN  [facility] Care Coordinator  [phone]  Fax:[phone]

## 2020-02-22 DIAGNOSIS — I10 ESSENTIAL HYPERTENSION WITH GOAL BLOOD PRESSURE LESS THAN 140/90: ICD-10-CM

## 2020-02-22 RX ORDER — ASPIRIN 81 MG/1
TABLET, CHEWABLE ORAL
Qty: 100 TABLET | Refills: 3 | Status: CANCELLED | OUTPATIENT
Start: 2020-02-22

## 2020-02-22 NOTE — TELEPHONE ENCOUNTER
"Requested Prescriptions   Pending Prescriptions Disp Refills     aspirin (ASA) 81 MG chewable tablet    Last Written Prescription Date:  09/17/2019  Last Fill Quantity: 100 tablet,  # refills: 3   Last office visit: 9/17/2019 with prescribing provider:  TOMY Feliciano   Future Office Visit:     Next 5 appointments (look out 90 days)    Mar 09, 2020  8:30 AM CDT  PHYSICAL with Asif Feliciano MD  [facility] [address] [address] [phone]          100 tablet 3     Sig: CHEW 1 TABLET DAILY AS NEEDED       Analgesics (Non-Narcotic Tylenol and ASA Only) Passed - 2/22/2020 11:35 AM        Passed - Recent (12 mo) or future (30 days) visit within the authorizing provider's specialty     Patient has had an office visit with the authorizing provider or a provider within the authorizing providers department within the previous 12 mos or has a future within next 30 days. See \"Patient Info\" tab in inbasket, or \"Choose Columns\" in Meds & Orders section of the refill encounter.              Passed - Patient is age 20 years or older     If ASA is flagged for ages under 20 years old. Forward to provider for confirmation Ryes Syndrome is not a concern.              Passed - Medication is active on med list           "

## 2020-02-24 NOTE — TELEPHONE ENCOUNTER
Pharmacy Contact    Called pharmacy to verify that rx is on file. Pharmacist states that the patient is requesting enteric coated aspirin instead of chewable.    Pended new rx, routing to provider to advise.    Routing refill request to provider for review/approval because:  Drug not active on patient's medication list

## 2020-02-25 RX ORDER — ASPIRIN 81 MG/1
81 TABLET, CHEWABLE ORAL DAILY
OUTPATIENT
Start: 2020-02-25

## 2020-02-25 NOTE — TELEPHONE ENCOUNTER
"Requested Prescriptions   Pending Prescriptions Disp Refills     aspirin (ASA) 81 MG chewable tablet  Last Written Prescription Date:  2/24/2020  Last Fill Quantity: 100 tablet,  # refills: 3   Last office visit: 9/17/2019 with prescribing provider:  TOMY Feliciano   Future Office Visit:   Next 5 appointments (look out 90 days)    Mar 09, 2020  8:30 AM CDT  PHYSICAL with Asif Feliciano MD  [facility] ([facility]) [address] [address] [phone]                Sig: Take 1 tablet (81 mg) by mouth daily       Analgesics (Non-Narcotic Tylenol and ASA Only) Passed - 2/25/2020  1:13 PM        Passed - Recent (12 mo) or future (30 days) visit within the authorizing provider's specialty     Patient has had an office visit with the authorizing provider or a provider within the authorizing providers department within the previous 12 mos or has a future within next 30 days. See \"Patient Info\" tab in inbasket, or \"Choose Columns\" in Meds & Orders section of the refill encounter.              Passed - Patient is age 20 years or older     If ASA is flagged for ages under 20 years old. Forward to provider for confirmation Ryes Syndrome is not a concern.              Passed - Medication is active on med list           "

## 2020-03-09 ENCOUNTER — PATIENT OUTREACH (OUTPATIENT)
Dept: CARE COORDINATION | Facility: CLINIC | Age: 80
End: 2020-03-09

## 2020-03-09 ENCOUNTER — APPOINTMENT (OUTPATIENT)
Dept: INTERPRETER SERVICES | Facility: CLINIC | Age: 80
End: 2020-03-09
Payer: MEDICARE

## 2020-03-09 ENCOUNTER — OFFICE VISIT (OUTPATIENT)
Dept: FAMILY MEDICINE | Facility: CLINIC | Age: 80
End: 2020-03-09
Payer: MEDICARE

## 2020-03-09 VITALS
OXYGEN SATURATION: 96 % | BODY MASS INDEX: 23.18 KG/M2 | SYSTOLIC BLOOD PRESSURE: 132 MMHG | HEART RATE: 68 BPM | WEIGHT: 148 LBS | TEMPERATURE: 97.4 F | DIASTOLIC BLOOD PRESSURE: 74 MMHG

## 2020-03-09 DIAGNOSIS — R20.0 NUMBNESS IN FEET: ICD-10-CM

## 2020-03-09 DIAGNOSIS — E78.5 HYPERLIPIDEMIA LDL GOAL <100: ICD-10-CM

## 2020-03-09 DIAGNOSIS — I10 HYPERTENSION GOAL BP (BLOOD PRESSURE) < 140/90: Primary | ICD-10-CM

## 2020-03-09 DIAGNOSIS — K21.9 GASTROESOPHAGEAL REFLUX DISEASE WITHOUT ESOPHAGITIS: ICD-10-CM

## 2020-03-09 DIAGNOSIS — G47.00 INSOMNIA, UNSPECIFIED TYPE: ICD-10-CM

## 2020-03-09 DIAGNOSIS — G83.20: ICD-10-CM

## 2020-03-09 DIAGNOSIS — I10 ESSENTIAL HYPERTENSION WITH GOAL BLOOD PRESSURE LESS THAN 140/90: ICD-10-CM

## 2020-03-09 LAB
ALT SERPL W P-5'-P-CCNC: 26 U/L (ref 0–70)
ANION GAP SERPL CALCULATED.3IONS-SCNC: 8 MMOL/L (ref 3–14)
BUN SERPL-MCNC: 8 MG/DL (ref 7–30)
CALCIUM SERPL-MCNC: 9.3 MG/DL (ref 8.5–10.1)
CHLORIDE SERPL-SCNC: 110 MMOL/L (ref 94–109)
CHOLEST SERPL-MCNC: 145 MG/DL
CO2 SERPL-SCNC: 22 MMOL/L (ref 20–32)
CREAT SERPL-MCNC: 0.99 MG/DL (ref 0.66–1.25)
GFR SERPL CREATININE-BSD FRML MDRD: 72 ML/MIN/{1.73_M2}
GLUCOSE SERPL-MCNC: 102 MG/DL (ref 70–99)
HDLC SERPL-MCNC: 55 MG/DL
LDLC SERPL CALC-MCNC: 63 MG/DL
NONHDLC SERPL-MCNC: 90 MG/DL
POTASSIUM SERPL-SCNC: 3.6 MMOL/L (ref 3.4–5.3)
SODIUM SERPL-SCNC: 140 MMOL/L (ref 133–144)
TRIGL SERPL-MCNC: 137 MG/DL

## 2020-03-09 PROCEDURE — 99214 OFFICE O/P EST MOD 30 MIN: CPT | Performed by: FAMILY MEDICINE

## 2020-03-09 PROCEDURE — 36415 COLL VENOUS BLD VENIPUNCTURE: CPT | Performed by: FAMILY MEDICINE

## 2020-03-09 PROCEDURE — 84460 ALANINE AMINO (ALT) (SGPT): CPT | Performed by: FAMILY MEDICINE

## 2020-03-09 PROCEDURE — 80061 LIPID PANEL: CPT | Performed by: FAMILY MEDICINE

## 2020-03-09 PROCEDURE — 80048 BASIC METABOLIC PNL TOTAL CA: CPT | Performed by: FAMILY MEDICINE

## 2020-03-09 RX ORDER — LOSARTAN POTASSIUM 25 MG/1
25 TABLET ORAL AT BEDTIME
Qty: 90 TABLET | Refills: 3 | Status: SHIPPED | OUTPATIENT
Start: 2020-03-09 | End: 2020-05-22

## 2020-03-09 RX ORDER — AMLODIPINE BESYLATE 2.5 MG/1
2.5 TABLET ORAL AT BEDTIME
Qty: 90 TABLET | Refills: 3 | Status: SHIPPED | OUTPATIENT
Start: 2020-03-09 | End: 2020-05-22
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
## 2020-03-09 NOTE — PATIENT INSTRUCTIONS
(I10) Hypertension goal BP (blood pressure) < 140/90  (primary encounter diagnosis)    Comment:      Plan: CARE COORDINATION REFERRAL, Basic metabolic           panel, Albumin Random Urine Quantitative with           Creat Ratio                   (R20.0) Numbness in feet    Comment:      Plan: CARE COORDINATION REFERRAL                   (K21.9) Gastroesophageal reflux disease without esophagitis    Comment:      Plan: CARE COORDINATION REFERRAL             (G47.00) Insomnia, unspecified type  Comment:    Plan: CARE COORDINATION REFERRAL             (E78.5) Hyperlipidemia LDL goal <100  Comment:    Plan: CARE COORDINATION REFERRAL, Lipid panel reflex         to direct LDL Fasting, ALT             (G83.20) PARALYSIS ARM - DOMINANT SIDE  Comment:    Plan:          TENDENCY TO FALL    5 -10MINUTES WALKING WALK IN PLACE , DANCING OR AEROBIC EXERCISE THREE TIMES DAILY     REDUCES RISK OF DYING 50%  ,THAT IS 25 MINUTES PER DAY     BALANCE WITH KNEE RAISED TO PREVENT FALLS INITIALLY EYES OPEN ON ONE FOOT 6    PREVENTS FALLS 40%    STRENGTHENING UPPER EXTREMETIES WITH 80% MAXIMAL LIFT STRENGTHENS LUMBAR AND THORACIC SPINE    YOU CAN DO THE SAME THING WITH 1 POUND WEIGHTS  100 EACH POSTION X 5     LALI HART JR., MD     (I10) Hypertension goal BP (blood pressure) < 140/90  (primary encounter diagnosis)  Comment:    Plan: CARE COORDINATION REFERRAL, Basic metabolic         panel, Albumin Random Urine Quantitative with         Creat Ratio, amLODIPine (NORVASC) 2.5 MG tablet             (R20.0) Numbness in feet  Comment:    Plan: CARE COORDINATION REFERRAL             (K21.9) Gastroesophageal reflux disease without esophagitis  Comment:    Plan: CARE COORDINATION REFERRAL             (G47.00) Insomnia, unspecified type  Comment:    Plan: CARE COORDINATION REFERRAL             (E78.5) Hyperlipidemia LDL goal <100  Comment:    Plan: CARE COORDINATION REFERRAL, Lipid panel reflex         to direct LDL Fasting, ALT              (G83.20) PARALYSIS ARM - DOMINANT SIDE  Comment:    Plan:      (I10) Essential hypertension with goal blood pressure less than 140/90  Comment:    Plan: losartan (COZAAR) 25 MG tablet

## 2020-03-09 NOTE — LETTER
"March 11, 2020      Petey Levy  [address]   [place] [address]        Dear ,    We are writing to inform you of your test results.    NORMAL TOTAL CHOLESTEROL   NORMAL LIVER FUNCTION TEST   NORMAL TRIGLYCERIDES   NORMAL HDL OR \"GOOD\" CHOLESTEROL   NORMAL LDL OR \"BAD\" CHOLESTEROL   NORMAL VERY LOW DENSITY CHOLESTEROL   NORMAL GLUCOSE, RENAL AND BLOOD SALTS   BORDERLINE  CHLORIDE NOT SIGNIFICANT   BORDERLINE  GLUCOSE     Resulted Orders   Basic metabolic panel   Result Value Ref Range    Sodium 140 133 - 144 mmol/L    Potassium 3.6 3.4 - 5.3 mmol/L    Chloride 110 (H) 94 - 109 mmol/L    Carbon Dioxide 22 20 - 32 mmol/L    Anion Gap 8 3 - 14 mmol/L    Glucose 102 (H) 70 - 99 mg/dL    Urea Nitrogen 8 7 - 30 mg/dL    Creatinine 0.99 0.66 - 1.25 mg/dL    GFR Estimate 72 >60 mL/min/[1.73_m2]      Comment:      Non  GFR Calc  Starting 12/18/2018, serum creatinine based estimated GFR (eGFR) will be   calculated using the Chronic Kidney Disease Epidemiology Collaboration   (CKD-EPI) equation.      GFR Estimate If Black 83 >60 mL/min/[1.73_m2]      Comment:       GFR Calc  Starting 12/18/2018, serum creatinine based estimated GFR (eGFR) will be   calculated using the Chronic Kidney Disease Epidemiology Collaboration   (CKD-EPI) equation.      Calcium 9.3 8.5 - 10.1 mg/dL   Lipid panel reflex to direct LDL Fasting   Result Value Ref Range    Cholesterol 145 <200 mg/dL    Triglycerides 137 <150 mg/dL    HDL Cholesterol 55 >39 mg/dL    LDL Cholesterol Calculated 63 <100 mg/dL      Comment:      Desirable:       <100 mg/dl    Non HDL Cholesterol 90 <130 mg/dL   ALT   Result Value Ref Range    ALT 26 0 - 70 U/L       If you have any questions or concerns, please call the clinic at the number listed above.       Sincerely,        LALI HRAT MD                "
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
## 2020-03-10 ENCOUNTER — PATIENT OUTREACH (OUTPATIENT)
Dept: GERIATRIC MEDICINE | Facility: CLINIC | Age: 80
End: 2020-03-10

## 2020-03-10 NOTE — PROGRESS NOTES
[facility] Care Coordination Contact    Received an Epic note from CLAIR at PCP office stating client is requesting PCA assessment. Placed f/u call to client and was able to reach him this time. Informed client that Care Coordinator will call to schedule visit within 20 days. Client is in agreement with CC's  plan.     Rafiq Davila RN BSN PHN  [facility] Care Coordinator  [phone]  Fax:[phone]

## 2020-03-19 ENCOUNTER — TELEPHONE (OUTPATIENT)
Dept: FAMILY MEDICINE | Facility: CLINIC | Age: 80
End: 2020-03-19

## 2020-03-19 DIAGNOSIS — E78.5 HYPERLIPIDEMIA LDL GOAL <100: ICD-10-CM

## 2020-03-19 RX ORDER — ATORVASTATIN CALCIUM 10 MG/1
10 TABLET, FILM COATED ORAL DAILY
Qty: 90 TABLET | Refills: 3 | Status: SHIPPED | OUTPATIENT
Start: 2020-03-19 | End: 2020-05-22

## 2020-03-19 NOTE — TELEPHONE ENCOUNTER
Fax from pharmacy.     Atorvastatin    States that they never received the renewal order from PCP on 9/17/2020. Requesting this order be sent again.     This task was complete.     No further follow up needed.

## 2020-03-30 DIAGNOSIS — I10 ESSENTIAL HYPERTENSION WITH GOAL BLOOD PRESSURE LESS THAN 140/90: ICD-10-CM

## 2020-03-30 NOTE — TELEPHONE ENCOUNTER
"Requested Prescriptions   Pending Prescriptions Disp Refills     atenolol (TENORMIN) 50 MG tablet  Last Written Prescription Date:  9/17/2019  Last Fill Quantity: 90 tablet,  # refills: 3   Last office visit: 3/9/2020 with prescribing provider:  TOMY Feliciano   Future Office Visit:   Next 5 appointments (look out 90 days)    Jun 02, 2020  2:00 PM CDT  Office Visit with Asif Feliciano MD  [facility] ([facility]) [address] [place] [id number]  [phone]          90 tablet 3     Sig: Take 1 tablet (50 mg) by mouth daily       Beta-Blockers Protocol Passed - 3/30/2020 10:09 AM        Passed - Blood pressure under 140/90 in past 12 months     BP Readings from Last 3 Encounters:   03/09/20 132/74   09/17/19 130/70   05/28/19 165/82                 Passed - Patient is age 6 or older        Passed - Recent (12 mo) or future (30 days) visit within the authorizing provider's specialty     Patient has had an office visit with the authorizing provider or a provider within the authorizing providers department within the previous 12 mos or has a future within next 30 days. See \"Patient Info\" tab in inbasket, or \"Choose Columns\" in Meds & Orders section of the refill encounter.              Passed - Medication is active on med list              "

## 2020-03-31 ENCOUNTER — PATIENT OUTREACH (OUTPATIENT)
Dept: GERIATRIC MEDICINE | Facility: CLINIC | Age: 80
End: 2020-03-31

## 2020-03-31 RX ORDER — ATENOLOL 50 MG/1
50 TABLET ORAL DAILY
Qty: 90 TABLET | Refills: 2 | Status: SHIPPED | OUTPATIENT
Start: 2020-03-31 | End: 2020-05-22

## 2020-03-31 ASSESSMENT — PATIENT HEALTH QUESTIONNAIRE - PHQ9: SUM OF ALL RESPONSES TO PHQ QUESTIONS 1-9: 4

## 2020-03-31 ASSESSMENT — ACTIVITIES OF DAILY LIVING (ADL): DEPENDENT_IADLS:: CLEANING;COOKING;LAUNDRY;SHOPPING;MEAL PREPARATION;INCONTINENCE

## 2020-03-31 NOTE — TELEPHONE ENCOUNTER
Pharmacy Contact    Called pharmacy to verify rx on file. They do not have on file.     Prescription approved per [facility] Refill Protocol.

## 2020-03-31 NOTE — PROGRESS NOTES
[facility] Care Coordination Contact    [facility] Home Visit Assessment     Home visit for Health Risk Assessment with Petey Levy completed on March 31, 2020    Type of residence:: Apartment - handicap accessible  Current living arrangement:: I live alone     Assessment completed with:: Patient    Current Care Plan  Member currently receiving the following home care services:     Member currently receiving the following community resources: PCA      Medication Review  Medication reconciliation completed in Epic: No: Face-to-face visits on hold due to COVID-19 restrictions.  Medication set-up & administration: Independent-does not set up.  Self-administers medications.  Medication Risk Assessment Medication (1 or more, place referral to MTM): N/A: No risk factors identified  MTM Referral Placed: No: due to COVID-19 restrictions.    Mental/Behavioral Health   Depression Screening: See PHQ assessment flowsheet.   Mental health DX:: Yes   Mental health DX how managed:: Medication  Mental Health Diagnosis: Yes: Insomnia  Mental Health Services: None: No further intervention needed at this time.    Falls Assessment:   Fallen 2 or more times in the past year?: Yes   Any fall with injury in the past year?: No    ADL/IADL Dependencies:   Dependent ADLs:: Ambulation-cane, Bathing, Dressing, Grooming, Transfers, Toileting  Dependent IADLs:: Cleaning, Cooking, Laundry, Shopping, Meal Preparation, Incontinence    [facility] Plan sponsored benefits: Shared information re: Silver Sneakers/gym memberships, ASA, Calcium +D.    PCA Assessment completed at visit: Yes     Elderly Waiver Eligibility: No request for waiver services.     Care Plan & Recommendations: Will place PCA service in place.     See [facility] for detailed assessment information.    Follow-Up Plan: Member informed of future contact, plan to f/u with member with a 6 month telephone assessment.  Contact information shared with member and family,  encouraged member to call with any questions or concerns at any time.    [place] care continuum providers: Please refer to Health Care Home on the Epic Problem List to view this patient's [facility] Care Plan Summary.    Rafiq Davila RN BSN PHN  [facility] Care Coordinator  [phone]  Fax:[phone]

## 2020-04-02 ENCOUNTER — PATIENT OUTREACH (OUTPATIENT)
Dept: GERIATRIC MEDICINE | Facility: CLINIC | Age: 80
End: 2020-04-02

## 2020-04-02 NOTE — LETTER
April 2, 2020    PETEY DELGADO  [address]   [place] [address]        Dear Petey:    At [facility], we are dedicated to improving your health and well-being. Enclosed is the Comprehensive Care Plan that we developed with you on 3/31/2020. Please review the Care Plan carefully.    As a reminder, some of the things we discussed at your visit include:    Your physical and mental health    Ways to reduce falls    Health care needs you may have    Don t forget to contact your care coordinator if you:    Have been hospitalized or plan to be hospitalized     Have had a fall     Have experienced a change in physical health    Are experiencing emotional problems     If you do not agree with your Care Plan, have questions about it, or have experienced a change in your needs, please call me at [phone]. If you are hearing impaired, please call the [place] Relay at 822 or [phone] (iufejf-dd-xulpfv relay service).    Sincerely,        Rafiq Davila RN, PHN    E-mail: [email or web address]  Phone: [phone]      [id number] ([phone]     [id number] (11/18)

## 2020-04-02 NOTE — PROGRESS NOTES
[facility] Care Coordination Contact    Received after visit chart from care coordinator.  Completed following tasks: Mailed copy of care plan to client, Updated services in access and POC sig sheet along with SASE  Chart was returned to CC.   , Provider Signature - No POC Shared:  Member indicates that they do not want their POC shared with any EW providers.    [facility]:  Emailed completed PCA assessment to [facility].  Faxed copy of PCA assessment to PCA Agency and mailed copy to member.  Faxed MD Communication to PCP.   Helen Hernandez  Case Management Specialist  [facility]  [phone]

## 2020-04-10 NOTE — PROGRESS NOTES
Rec'd PCA sig page.  This CMS emailed off to [facility] and rightfaxed to PCA agency.  CC notified.  Helen Hernandez  Case Management Specialist  [facility]  [phone]

## 2020-04-18 ENCOUNTER — NURSE TRIAGE (OUTPATIENT)
Dept: NURSING | Facility: CLINIC | Age: 80
End: 2020-04-18

## 2020-04-19 ENCOUNTER — VIRTUAL VISIT (OUTPATIENT)
Dept: URGENT CARE | Facility: CLINIC | Age: 80
End: 2020-04-19
Payer: MEDICARE

## 2020-04-19 ENCOUNTER — TELEPHONE (OUTPATIENT)
Dept: FAMILY MEDICINE | Facility: CLINIC | Age: 80
End: 2020-04-19

## 2020-04-19 DIAGNOSIS — Z20.822 SUSPECTED COVID-19 VIRUS INFECTION: Primary | ICD-10-CM

## 2020-04-19 PROCEDURE — 99442: CPT | Mod: GC | Performed by: STUDENT IN AN ORGANIZED HEALTH CARE EDUCATION/TRAINING PROGRAM

## 2020-04-19 NOTE — TELEPHONE ENCOUNTER
----- Message from Sheree Cleveland MD sent at 4/19/2020 12:06 PM CDT -----  Greetings,     I just wanted to update you on the above patient. He completed a virtual care visit via his daughter on 4/19. Based on his symptoms and exposure history, I am concerned that he has COVID-19. We sent his daughter a link to get him plugged into the [employer] Loop, so she can regularly check-in with us on how he is doing. However, I just wanted to let you know as well in case you wanted to check-in/monitor as well.     Thanks, Sheree Cleveland   Internal Medicine Resident

## 2020-04-19 NOTE — PATIENT INSTRUCTIONS
- Please sign-up for the GetWell Loop  - Continue to monitor for signs of dehydration, which would include: dizziness, lightheadedness, or decreased/absent urine output  - Continue to monitor for shortness of breath. Concerning oxygen saturation is <92%.

## 2020-04-19 NOTE — TELEPHONE ENCOUNTER
S:Calling about cough.  B: Patient gave writer permission to talk with his daughter about his health care. Daughter is being the interperture.  Cough started on 4/16.  Has a productive cough. Sometimes there is blood in the sputum. Gets the hick ups through out the day.  O2 sat 100% Does not have a thermometer,  does not feel warm to the touch.  Has some nasal discharge.  Diarrhea x1 today. Has no appetite, only ate 2 oranges today.  Sleeping poorly.    A: Will set up an appointment for urgent care provider to talk with patient and daughter.   R: Appointment set up for Jass morning. Encouraged daughter to call back is coughing or diarrhea get worse.   Cydney Chamberlain RN, [facility]    Reason for Disposition    [1] Coughed up blood-tinged sputum AND [2] more than once    Additional Information    Negative: Severe difficulty breathing (e.g., struggling for each breath, speaks in single words)    Negative: Bluish (or gray) lips or face now    Negative: [1] Difficulty breathing AND [2] exposure to flames, smoke, or fumes    Negative: [1] Stridor AND [2] difficulty breathing    Negative: Sounds like a life-threatening emergency to the triager    Negative: Chest pain  (Exception: MILD central chest pain, present only when coughing)    Negative: Difficulty breathing    Negative: Patient sounds very sick or weak to the triager    Negative: [1] Coughed up blood AND [2] > 1 tablespoon (15 ml) (Exception: blood-tinged sputum)    Negative: Fever > 103 F (39.4 C)    Negative: [1] Fever > 101 F (38.3 C) AND [2] age > 60    Negative: [1] Fever > 100.0 F (37.8 C) AND [2] bedridden (e.g., nursing home patient, CVA, chronic illness, recovering from surgery)    Negative: [1] Fever > 100.0 F (37.8 C) AND [2] diabetes mellitus or weak immune system (e.g., HIV positive, cancer chemo, splenectomy, chronic steroids)    Negative: Wheezing is present    Negative: [1] Nasal discharge AND [2] present > 10 days    Negative: Cough  has been present for > 3 weeks    Negative: SEVERE coughing spells (e.g., whooping sound after coughing, vomiting after coughing)    Negative: [1] Continuous (nonstop) coughing interferes with work or school AND [2] no improvement using cough treatment per Care Advice    Negative: Coughing up aiyana-colored (reddish-brown) sputum    Negative: Fever present > 3 days (72 hours)    Negative: [1] Fever returns after gone for over 24 hours AND [2] symptoms worse or not improved    Negative: [1] Using nasal washes and pain medicine > 24 hours AND [2] sinus pain (around cheekbone or eye) persists    Negative: Earache    Negative: [1] Known COPD or other severe lung disease (i.e., bronchiectasis, cystic fibrosis, lung surgery) AND [2] worsening symptoms (i.e., increased sputum purulence or amount, increased breathing difficulty    Protocols used: COUGH - ACUTE [id number]    COVID 19 Nurse Triage Plan/Patient Instructions    Please be aware that novel coronavirus (COVID-19) may be circulating in the community. If you develop symptoms such as fever, cough, or SOB or if you have concerns about the presence of another infection including coronavirus (COVID-19), please contact your health care provider or visit [email or web address].     Disposition/Instructions      Thank you for limiting contact with others, wearing a simple mask to cover your cough, practice good hand hygiene habits and accessing our virtual services where possible to limit the spread of this virus.    For more information about COVID19 and options for caring for yourself at home, please visit the CDC website at https://www.cdc.gov/coronavirus/2019-ncov/about/steps-when-sick.html  For more options for care at [facility], please visit our website at [email or web address]    For more information, please use the [place] Department of Health ([facility]) COVID-19 Hotlines (Interpreters available):     Health questions: Phone Number:  [phone] or [phone] and Hours: 7 a.m. to 7 p.m.    Schools and  questions: Phone Number: [phone] or [phone] and Hours 7 a.m. to 7 p.m.

## 2020-04-19 NOTE — TELEPHONE ENCOUNTER
Please follow up   Next week with telephone to make sure he does not have Covid 19  Asif Feliciano Jr., MD

## 2020-04-19 NOTE — PROGRESS NOTES
I was present during the key/critical portion of the telephone visit. The patient acknowledged that I participated in the telephone conversation. I discussed the case with the resident and agree with the note as documented by the resident.    I personally spent a total of 16 minutes speaking with Petey Levy during today s visit.     Getting great support and monitoring from his daughter, which is reassuring. [facility] the [facility] 16 days and will let PCP know about situation. Daughter has been checking pulse ox at home with [employer] alejo. There is very limited data I am able to find about the test accuracy for this. Encouraged daughter to talk with Petey about his breathing and rely more on how heavily he is breathing and what is he able to do around the house, rather than a number on the alejo.     Dusty Bland MD  4/19/2020 at 1:39 PM

## 2020-04-19 NOTE — PROGRESS NOTES
"Petey Levy is a 80 year old male who is being evaluated via a billable telephone visit.      The patient has been notified of following:     \"This telephone visit will be conducted via a call between you and your physician/provider. We have found that certain health care needs can be provided without the need for a physical exam.  This service lets us provide the care you need with a short phone conversation.  If a prescription is necessary we can send it directly to your pharmacy.  If lab work is needed we can place an order for that and you can then stop by our lab to have the test done at a later time.    Telephone visits are billed at different rates depending on your insurance coverage. During this emergency period, for some insurers they may be billed the same as an in-person visit.  Please reach out to your insurance provider with any questions.    If during the course of the call the physician/provider feels a telephone visit is not appropriate, you will not be charged for this service.\"    Patient has given verbal consent for Telephone visit?  Yes    Subjective   Petey Levy is a 80 year old male with PMH of HTN, HLD, insomnia, GERD who presents to clinic today for the following health issues:     HPI:   The patient has been feeling unwell for the past few days. He started to have diarrhea 2 days ago. His last episode of diarrhea was yesterday afternoon. There was no blood in his diarrhea.     The patient has been feeling quite fatigued. He is currently warm to the touch, but no thermometer available to know exact temperature. He has a productive cough with blood-tinged sputum at times. Small amount of blood. His O2 saturations are 100% on home alejo.       2 of patients' friends tested positive for COVID-19. His last exposure to them was within the last 2 weeks.     He lives in an apartment by himself. He gets help from his daughter for ADLs.     (+) Fatigue, anorexia, hiccups, runny " nose, productive cough, chest discomfort with coughing, diarrhea  (-) Fever, dizziness, headache, nausea, vomiting, sore throat, abdominal pain, anuria      PMH:   HTN  HLD  Insomnia  GERD    Medications:   Atenolol   Atorvastatin  Amlodipine  Losartan  ASA  Gabapentin  Omeprazole  Seroquel  Trazodone    Social History:   Tobacco  Alcohol  Drug  Travel  COVID exposures: 2 of patients' friends tested positive for COVID      Review of Systems   ROS COMP: Constitutional, HEENT, cardiovascular, pulmonary, gi and gu systems are negative, except as otherwise noted.    Objective   HR: 79 O2: 100%   General: No acute distress  Resp: Breathing comfortably on room air    Assessment/Plan:  Viral URI, suspected COVID-19  The patient developed fatigue, productive cough, and diarrhea a couple of days ago. He was recently exposed to 2 people who tested positive for COVID-19. His symptoms are consistent with a viral infection, cannot exclude COVID-19 especially with exposure history. I discussed with the patient and his daughter that it will be very important to maintain adequate hydration. We discussed signs of dehydration and those signs should prompt another VUC evaluation or presentation to the ED if severe. The patient's daughter is his caretaker. We discussed CDC recommendations regarding self-isolation for the patient and for this daughter.   - GetWell Loop order placed   - Send updates to patient's PCP      Discussed with my attending, Dr. Bland.     Phone call duration:  45 minutes    Sheree Cleveland DO  PGY2 Internal Medicine

## 2020-04-20 ENCOUNTER — TRANSFERRED RECORDS (OUTPATIENT)
Dept: HEALTH INFORMATION MANAGEMENT | Facility: CLINIC | Age: 80
End: 2020-04-20

## 2020-04-27 ENCOUNTER — PATIENT OUTREACH (OUTPATIENT)
Dept: GERIATRIC MEDICINE | Facility: CLINIC | Age: 80
End: 2020-04-27

## 2020-04-27 NOTE — PROGRESS NOTES
[facility] Care Coordination Contact    CC received notification of Hospital admission.  Hospital admission occurred on 4/20/20 at [facility]  with Dx of COVID-19. Currently in the ICU on vent.   CC contacted Hospital  Gaina Burnett [phone] and reviewed community POC. CC requested to be notified of concerns, care conference dates and discharge planning.   CC reached out to family Eliz Novoa(cousin)  regarding transition and offered support as needed.  Reviewed and update care plan as needed.  Notified community service providers and placed services PCA on hold as needed.  Transition log initiated.   PCP notified of hospitalization via EMR.    Rafiq Davila RN BSN PHN  [facility] Care Coordinator  [phone]  Fax:[phone]

## 2020-04-30 NOTE — PROGRESS NOTES
[facility] Care Coordination Contact    Nurse Thais returned call and reported client was just taken off the ventilator. Client is breathing on his own and alert per nurse. Will continue to f/u.    Rafiq DavilaRN BSN PHN  [facility] Care Coordinator  [phone]  Fax:[phone]

## 2020-04-30 NOTE — PROGRESS NOTES
[facility] Care Coordination Contact    Placed f/u call to the nurses station. Was told nurse is currently busy working with the client. Left CC contact number and awaiting for a callback.    Rafiq Davila RN BSN PHN  [facility] Care Coordinator  [phone]  Fax:[phone]

## 2020-05-07 NOTE — PROGRESS NOTES
[facility] Care Coordination Contact    Transition Follow-Up: Client remains inpatient at [facility] as of 5/7/20. Will continue to follow.     Rafiq Davila RN BSN PHN  [facility] Care Coordinator  [phone]  Fax:[phone]

## 2020-05-14 NOTE — PROGRESS NOTES
[facility] Care Coordination Contact    Called [facility] to follow-up on client's status. Nurse shared that client is breathing better with an oxygen saturation of 95% on RA.  States he still coughing and experiencing intermittent confusion. Nurse also reports client is participating in physical therapy and tolerating well. No discharge plans at this time per nurse. Will continue to follow-up.     Rafiq Davila RN BSN PHN  [facility] Care Coordinator  [phone]  Fax:[phone]

## 2020-05-17 ENCOUNTER — TRANSFERRED RECORDS (OUTPATIENT)
Dept: HEALTH INFORMATION MANAGEMENT | Facility: CLINIC | Age: 80
End: 2020-05-17

## 2020-05-18 NOTE — PROGRESS NOTES
[facility] Care Coordination Contact    CC received notification of discharge to home. Discharge occurred on 5/17/20 from [facility].  CC contacted member and reviewed discharge summary.  Member has a follow-up appointment with PCP in 7 days: Yes: scheduled on 6/2/20. Will offer to schedule sooner appt.   Member has had a change in condition: No  Home visit needed: No  Care plan reviewed and updated.  The following home based services PCA were resumed.  New referrals placed: Yes: Therapies: PT/OT SNV at hospital discharge.   Transition log completed.   PCP notified of transition back to home via EMR.  Rafiq Davila RN BSN PHN  [facility] Care Coordinator  [phone]  Fax:[phone]    TRANSITIONS OF CARE (SUBHA) LOG   SUBHA tasks should be completed by the CC within one (1) business day of notification of each transition. Follow up contact with member is required after return to their usual care setting.  Note:  If CC finds out about the transitions fifteen (15) days or more after the member has returned to their usual care setting, no SUBHA log is needed. However, the CC should check in with the member to discuss the transition process, any changes needed to the care plan and document it in a case note.    Member Name:  Petey Levy [facility] Name:  [facility]/Health Plan Member ID#: [id number]   Product: [employer] Care Coordinator Contact:  Rafiq Davila RN, PHN Agency/[facility]/Care System: [facility]   Transition Communication Actions from Care Management Contact   Transition #1   Notification Date: 4/27/20 Transition Date:   4/20/20 Transition From: Home     Is this the member s usual care setting?               yes Transition To: Hospital, [facility]   Transition Type:  Unplanned  Reason for Admission/Comments: CC received notification of Hospital admission.  Hospital admission occurred on 4/20/20 at [facility]  with Dx of COVID-19. Currently in the ICU on vent.   CC  contacted Hospital  [facility] [phone] and reviewed community POC. CC requested to be notified of concerns, care conference dates and discharge planning.   CC reached out to family Eliz Novoa(cousin)  regarding transition and offered support as needed.  Reviewed and update care plan as needed.  Notified community service providers and placed services PCA on hold as needed.  Transition log initiated.   PCP notified of hospitalization via EMR.       Shared CC contact info, care plan/services with receiving setting--Date completed: 4/27/20   Notified PCP of transition--Date completed:  4/27/20     via  EMR   Transition #2   Transition #3  (if applicable)   Notification Date: 5/18/20         Transition To:  Home  Transition Date: 5/17/20     Transition Type:    Planned  Notified PCP -- Date completed: 5/18/20              Shared CC contact info, care plan/services with receiving setting or, if applicable, home care agency--Date completed:  5/18/20  Complete additional tasks below, if this transition is a return to usual care setting.      Comments:   CC received notification of discharge to home. Discharge occurred on 5/17/20 from [facility].  CC contacted member and reviewed discharge summary.  Member has a follow-up appointment with PCP in 7 days: Yes: scheduled on 6/2/20. Will offer to schedule sooner appt.   Member has had a change in condition: No  Home visit needed: No  Care plan reviewed and updated.  The following home based services PCA were resumed.  New referrals placed: Yes: Therapies: PT/OT SNV at hospital discharge.   Transition log completed.   PCP notified of transition back to home via EMR. Notification Date:          Transition To:   Transition Date:            Transition Type:     Notified PCP--Date completed:      Shared CC contact info, care plan/services with receiving setting or, if applicable, home care agency--Date completed:   Complete additional tasks  below, if this transition is a return to usual care setting.      Comments:       *Complete tasks below when the member is discharging TO their usual care setting within one (1) business day of notification.  For situations where the Care Coordinator is notified of the discharge prior to the date of discharge, the Care Coordinator must follow up with the member or designated representative to confirm that discharge actually occurred and discuss required SUBHA tasks as outlined in the SUBHA Instructions.  (This includes situations where it may be a  new  usual care setting for the member. (i.e., a community member who decides upon permanent nursing home placement following hospitalization and rehab).    Date completed:  Communicated with member or their designated representative about the following:  care transition process; about changes to the member s health status; plan of care updates; education about transitions and how to prevent unplanned transitions/readmissions  Four Pillars for Optimal Transition:    Check  Yes  - if the member, family member and/or SNF/facility staff manages the following:    If  No  provide explanation in the comments section.          [x]  Yes     []  No     Does the member have a follow-up appointment scheduled with primary care or specialist? (Mental health hospitalizations--the appt. should be w/in 7 days)   [x]  Yes     []  No     Can the member manage their medications or is there a system in place to manage medications (e.g. home care set-up)?         [x]  Yes     []  No     Can the member verbalize warning signs and symptoms to watch for and how to respond?         [x]  Yes     []  No     Does the member use a Personal Health Care Record?  Check  Yes  if visit summary, discharge summary, and/or healthcare summary are being used as a PHR.                                                                                                                                                                                     [x] Yes      [] No      Have you updated the member s care plan?  If  No  provide explanation in comments.   Comments:

## 2020-05-19 DIAGNOSIS — G47.00 INSOMNIA, UNSPECIFIED TYPE: ICD-10-CM

## 2020-05-19 DIAGNOSIS — R20.0 NUMBNESS IN FEET: ICD-10-CM

## 2020-05-19 DIAGNOSIS — G62.9 PERIPHERAL POLYNEUROPATHY: ICD-10-CM

## 2020-05-19 DIAGNOSIS — M19.042 PRIMARY OSTEOARTHRITIS OF LEFT HAND: Primary | ICD-10-CM

## 2020-05-19 NOTE — TELEPHONE ENCOUNTER
Controlled Substance Refill Request for     gabapentin (NEURONTIN) 100 MG capsule     HYDROcodone-acetaminophen (NORCO) 5-325 MG per tablet 1 tablet       Problem List Complete:  No     PROVIDER TO CONSIDER COMPLETION OF PROBLEM LIST AND OVERVIEW/CONTROLLED SUBSTANCE AGREEMENT    Future Office visit:   Next 5 appointments (look out 90 days)    May 22, 2020 11:00 AM CDT  [facility] [address] [place] [phone]  [phone]   Jun 02, 2020  2:00 PM CDT  Office Visit with [facility] [address] [place] [phone]  [phone]          Controlled substance agreement:   Encounter-Level CSA:    There are no encounter-level csa.     Patient-Level CSA:    There are no patient-level csa.         Last Urine Drug Screen: No results found for: CDAUT, No results found for: COMDAT, No results found for: THC13, PCP13, COC13, MAMP13, OPI13, AMP13, BZO13, TCA13, MTD13, BAR13, OXY13, PPX13, BUP13     Processing:  Rx to be electronically transmitted to pharmacy by provider      [email or web address]       checked in past 3 months?  Yes 5/10/20

## 2020-05-19 NOTE — TELEPHONE ENCOUNTER
Reason for Call:  Medication or medication refill:    Do you use a [facility]?  Name of the [facility]    Name of the medication requested:gabapentin (NEURONTIN) 100 MG     traZODone (DESYREL) 50 MG tablet  Norco 5/325  Other request: all for nerve pain and sleep    Can we leave a detailed message on this number? YES    Phone number patient can be reached at: Home number on file [phone] (home)    Best Time:   Call taken on 5/19/2020 at 4:44 PM by PIYUSH MEDINA

## 2020-05-20 RX ORDER — TRAZODONE HYDROCHLORIDE 50 MG/1
TABLET, FILM COATED ORAL
Qty: 60 TABLET | Refills: 11 | Status: SHIPPED | OUTPATIENT
Start: 2020-05-20 | End: 2020-05-22

## 2020-05-20 RX ORDER — HYDROCODONE BITARTRATE AND ACETAMINOPHEN 5; 325 MG/1; MG/1
1 TABLET ORAL EVERY 6 HOURS PRN
Qty: 10 TABLET | Refills: 0 | Status: SHIPPED | OUTPATIENT
Start: 2020-05-20 | End: 2020-05-26

## 2020-05-20 RX ORDER — GABAPENTIN 100 MG/1
100 CAPSULE ORAL 3 TIMES DAILY
Qty: 180 CAPSULE | Refills: 3 | Status: SHIPPED | OUTPATIENT
Start: 2020-05-20 | End: 2020-05-22

## 2020-05-22 ENCOUNTER — VIRTUAL VISIT (OUTPATIENT)
Dept: FAMILY MEDICINE | Facility: CLINIC | Age: 80
End: 2020-05-22
Payer: MEDICARE
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
## 2020-05-22 DIAGNOSIS — U07.1 RESPIRATORY TRACT INFECTION DUE TO COVID-19 VIRUS: Primary | ICD-10-CM

## 2020-05-22 DIAGNOSIS — E55.9 VITAMIN D DEFICIENCY: ICD-10-CM

## 2020-05-22 DIAGNOSIS — K59.04 CHRONIC IDIOPATHIC CONSTIPATION: ICD-10-CM

## 2020-05-22 DIAGNOSIS — I10 HYPERTENSION GOAL BP (BLOOD PRESSURE) < 140/90: ICD-10-CM

## 2020-05-22 DIAGNOSIS — J98.8 RESPIRATORY TRACT INFECTION DUE TO COVID-19 VIRUS: Primary | ICD-10-CM

## 2020-05-22 DIAGNOSIS — E78.5 HYPERLIPIDEMIA LDL GOAL <100: ICD-10-CM

## 2020-05-22 PROCEDURE — 99443: CPT | Performed by: FAMILY MEDICINE

## 2020-05-22 RX ORDER — CHOLECALCIFEROL (VITAMIN D3) 50 MCG
1 TABLET ORAL DAILY
Qty: 90 TABLET | Refills: 3 | Status: SHIPPED | OUTPATIENT
Start: 2020-05-22 | End: 2020-09-23

## 2020-05-22 RX ORDER — DOCUSATE SODIUM 100 MG/1
100 CAPSULE, LIQUID FILLED ORAL 2 TIMES DAILY
Qty: 30 CAPSULE | Refills: 0 | Status: SHIPPED | OUTPATIENT
Start: 2020-05-22 | End: 2020-07-06

## 2020-05-22 RX ORDER — HYDROCODONE BITARTRATE AND ACETAMINOPHEN 5; 325 MG/1; MG/1
1 TABLET ORAL EVERY 6 HOURS PRN
Qty: 18 TABLET | Refills: 0 | Status: SHIPPED | OUTPATIENT
Start: 2020-05-22 | End: 2020-05-26

## 2020-05-22 RX ORDER — ATORVASTATIN CALCIUM 10 MG/1
10 TABLET, FILM COATED ORAL DAILY
Qty: 90 TABLET | Refills: 3 | Status: SHIPPED | OUTPATIENT
Start: 2020-05-22 | End: 2020-09-23

## 2020-05-22 RX ORDER — AMLODIPINE BESYLATE 2.5 MG/1
2.5 TABLET ORAL AT BEDTIME
Qty: 90 TABLET | Refills: 3 | Status: SHIPPED | OUTPATIENT
Start: 2020-05-22 | End: 2020-06-02
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
## 2020-05-26 ENCOUNTER — TELEPHONE (OUTPATIENT)
Dept: FAMILY MEDICINE | Facility: CLINIC | Age: 80
End: 2020-05-26

## 2020-05-26 DIAGNOSIS — G62.9 PERIPHERAL POLYNEUROPATHY: Primary | ICD-10-CM

## 2020-05-26 DIAGNOSIS — M19.042 PRIMARY OSTEOARTHRITIS OF LEFT HAND: ICD-10-CM

## 2020-05-26 RX ORDER — HYDROCODONE BITARTRATE AND ACETAMINOPHEN 5; 325 MG/1; MG/1
1 TABLET ORAL EVERY 6 HOURS PRN
Qty: 30 TABLET | Refills: 0 | Status: SHIPPED | OUTPATIENT
Start: 2020-05-26 | End: 2020-09-23

## 2020-05-26 RX ORDER — GABAPENTIN 300 MG/1
300 CAPSULE ORAL 3 TIMES DAILY
Qty: 90 CAPSULE | Refills: 0 | Status: SHIPPED | OUTPATIENT
Start: 2020-05-26 | End: 2020-06-18
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
## 2020-05-26 NOTE — TELEPHONE ENCOUNTER
Reason for Call:  Other call back    Detailed comments: wants a call to  Make sure is ok to continue to gabapentin was on list from hospital of meds not to take but he really needs for leg pain    Phone Number Patient can be reached at: Other phone number:  Call dtr Bessy    Best Time:     Can we leave a detailed message on this number? YES    Call taken on 5/26/2020 at 10:09 AM by PIYUSH MEDINA     [Negative] : Heme/Lymph

## 2020-05-26 NOTE — TELEPHONE ENCOUNTER
Abilio Burnett MD  [facility] Triage; [facility] Patient Care Team 1; [facility] Patient Care Team 2 6 minutes ago (3:27 PM)       I refilled the patient's Norco and he can increase his gabapentin to 300 mg 3 times daily.  A new prescription was sent to his pharmacy for that dose.    Routing comment        Patient was called with this message. Will need TC to call [facility] [phone] for medical records.

## 2020-05-27 ENCOUNTER — TELEPHONE (OUTPATIENT)
Dept: FAMILY MEDICINE | Facility: CLINIC | Age: 80
End: 2020-05-27

## 2020-05-27 ENCOUNTER — NURSE TRIAGE (OUTPATIENT)
Dept: FAMILY MEDICINE | Facility: CLINIC | Age: 80
End: 2020-05-27

## 2020-05-27 ENCOUNTER — HOSPITAL ENCOUNTER (EMERGENCY)
Facility: CLINIC | Age: 80
Discharge: HOME | End: 2020-05-27
Attending: EMERGENCY MEDICINE | Admitting: EMERGENCY MEDICINE
Payer: MEDICARE

## 2020-05-27 ENCOUNTER — MEDICAL CORRESPONDENCE (OUTPATIENT)
Dept: HEALTH INFORMATION MANAGEMENT | Facility: CLINIC | Age: 80
End: 2020-05-27

## 2020-05-27 VITALS
DIASTOLIC BLOOD PRESSURE: 91 MMHG | OXYGEN SATURATION: 99 % | SYSTOLIC BLOOD PRESSURE: 167 MMHG | TEMPERATURE: 98.9 F | RESPIRATION RATE: 20 BRPM

## 2020-05-27 DIAGNOSIS — M79.661 PAIN OF RIGHT LOWER LEG: ICD-10-CM

## 2020-05-27 PROCEDURE — 25000132 ZZH RX MED GY IP 250 OP 250 PS 637: Mod: GY | Performed by: EMERGENCY MEDICINE

## 2020-05-27 PROCEDURE — 99283 EMERGENCY DEPT VISIT LOW MDM: CPT

## 2020-05-27 RX ORDER — GABAPENTIN 300 MG/1
300 CAPSULE ORAL ONCE
Status: COMPLETED | OUTPATIENT
Start: 2020-05-27 | End: 2020-05-27

## 2020-05-27 RX ORDER — HYDROCODONE BITARTRATE AND ACETAMINOPHEN 5; 325 MG/1; MG/1
2 TABLET ORAL ONCE
Status: COMPLETED | OUTPATIENT
Start: 2020-05-27 | End: 2020-05-27

## 2020-05-27 RX ADMIN — HYDROCODONE BITARTRATE AND ACETAMINOPHEN 2 TABLET: 5; 325 TABLET ORAL at 03:42

## 2020-05-27 RX ADMIN — GABAPENTIN 300 MG: 300 CAPSULE ORAL at 03:42

## 2020-05-27 ASSESSMENT — ENCOUNTER SYMPTOMS
NUMBNESS: 1
MYALGIAS: 1
FEVER: 0
SHORTNESS OF BREATH: 0
COUGH: 0
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
## 2020-05-27 NOTE — DISCHARGE INSTRUCTIONS
Please  your prescriptions from your doctor at [facility] [employer] in [place] and take them as directed.  Please do physical therapy as planned.

## 2020-05-27 NOTE — TELEPHONE ENCOUNTER
Reason for Call:  Form, our goal is to have forms completed with 72 hours, however, some forms may require a visit or additional information.    Type of letter, form or note:  medical    Who is the form from?: Home care    Where did the form come from: form was faxed in    What clinic location was the form placed at?: [facility]    Where the form was placed: Given to physician    What number is listed as a contact on the form?: [phone] (P) [phone]       Additional comments: [facility]: OT and PT orders    Call taken on 5/27/2020 at 10:47 AM by Narcisa Ambriz

## 2020-05-27 NOTE — TELEPHONE ENCOUNTER
Pt's daughter Erika called reporting pt has right leg pain from foot to hip. He was seen at ED last night and has anxiety from being there alone. Pt has used medication prescribed but pain has not improved. Daughter also notes he has right foot swelling. She denies symptoms of chest pain, leg warmth or breathing problems during call. Daughter asked if clinic provider would be able to order an x-ray to rule out a blood clot since it was not done last night. Writer explained OV would be needed for any orders and if pt is having breathing problems, chest pain, increased swelling or breathing problems he should seek care at ED. Pt was sleeping during call. Daughter agreed to have family members monitor any symptoms changes. She will keep telephone visit for now until further directives. Please advice. Should telephone appt be changed to OV with in clinic provider?

## 2020-05-27 NOTE — ED TRIAGE NOTES
Right leg pain throughout his life. However was just discharged on 5/17/20 after being dx with covid.  Reports the right leg pain is so bad that he cannot sleep.

## 2020-05-27 NOTE — ED PROVIDER NOTES
History     Chief Complaint:  Leg Pain (Right)    HPI   Pteey Levy is a 80 year old male former tobacco user with a history of hypertension who presents with right leg pain. The patient reports that he was discharged to [facility] on 05/17/2020 after a COVID-19 diagnosis. The patient was intubated during his admission. The patient states that he has a history of chronic nerve pain, but it has worsened since his discharge. He states that he is having difficulty sleeping due to the pain. He is currently taking Norco every six hours, but reports minimal relief. He denies any falls or trauma.     Allergies:  No known drug allergies    Medications:    Lipitor  Norvasc  Colace  Gabapentin     Past Medical History:    Hypertension   Soft tissue tumor, benign   GERD    Past Surgical History:    Hand surgery   Orthopedic surgery   Phacoemulsification clear cornea with standard intraocular lens implant    Family History:    Father: known genetic syndrome  Mother: known genetic syndrome    Social History:  Smoking status: Former. Quit date: 2012  Alcohol use: No  Drug use: No  The patient presents to the emergency department alone via personal vehicle   PCP: Asif Feliciano  Marital Status:  Single [1]    Review of Systems   Constitutional: Negative for fever.   Respiratory: Negative for cough and shortness of breath.    Musculoskeletal: Positive for myalgias.   Neurological: Positive for numbness.   All other systems reviewed and are negative.      Physical Exam     Patient Vitals for the past 24 hrs:   BP Temp Temp src Heart Rate Resp SpO2   05/27/20 0319 -- 98.9  F (37.2  C) Oral -- -- --   05/27/20 0310 (!) 167/91 -- -- 94 20 99 %       Physical Exam  General: Appears well-developed and well-nourished.   Head: No signs of trauma.   CV: Normal rate and regular rhythm.    Resp: Effort normal and breath sounds normal. No respiratory distress.   GI: Soft. There is no tenderness.  No rebound or  guarding.  Normal bowel sounds.   MSK: Normal range of motion. no edema. No Calf tenderness.  Rubbing right leg, moving it about in bed.  Neuro: The patient is alert and oriented.  Strength in lower extremities normal and symmetrical. Sensation normal. Speech normal.  Skin: Skin is warm and dry. No rash noted.   Psych: normal mood and affect. behavior is normal.       Emergency Department Course   Interventions:  0342 Gabapentin 300 mg PO   0342 Norco 5-325 2 tablets PO    Emergency Department Course:  Past medical records, nursing notes, and vitals reviewed.  0323: I performed an exam of the patient and obtained history, as documented above.     0404: I rechecked the patient. I reviewed the results with the Patient and answered all related questions prior to discharge.     Findings and plan explained to the Patient. Patient discharged home with instructions regarding supportive care, medications, and reasons to return. The importance of close follow-up was reviewed.   Impression & Plan   Medical Decision Making:  Petey Levy is an 80-year-old gentleman presents due to pain to the right leg.  Pain is diffuse throughout the leg and apparently has been present for many years.  He had actually recently  been in the hospital for COVID-19 and discharge approximately 1 week ago.  He reports that since being in the hospital his pain has increased.  He is on Norco and gabapentin for it.  He had talked to his doctor yesterday and the note does document that the plan is to increase his gabapentin to 300 mg 3 times daily and he was given a refill of his Norco.  Apparently, the patient had not understood these directions as he did not made this change yet.  Patient was given a higher dose of gabapentin along with Norco which seemed to adequately control his pain and he was requesting discharge.  He has physical therapy scheduled for tomorrow, which I feel may likely give him some benefit.  Clinically I have low  suspicion for DVT given his exam and the chronic nature of his symptoms.  He was recommended to follow-up with his primary care doctor for continued management and to return for any further concerns.    Diagnosis:    ICD-10-CM   1. Pain of right lower leg  M79.661     Disposition:  Discharged to home.    Christie Adler  5/27/2020    EMERGENCY DEPARTMENT  Scribe Disclosure:  IChristie, am serving as a scribe at 3:23 AM on 5/27/2020 to document services personally performed by Abilio Ortega MD based on my observations and the provider's statements to me.        Abilio Ortega MD  05/30/20 [id number]

## 2020-05-28 ENCOUNTER — TRANSFERRED RECORDS (OUTPATIENT)
Dept: HEALTH INFORMATION MANAGEMENT | Facility: CLINIC | Age: 80
End: 2020-05-28

## 2020-05-28 ENCOUNTER — OFFICE VISIT (OUTPATIENT)
Dept: FAMILY MEDICINE | Facility: CLINIC | Age: 80
End: 2020-05-28
Payer: MEDICARE

## 2020-05-28 VITALS
RESPIRATION RATE: 16 BRPM | SYSTOLIC BLOOD PRESSURE: 120 MMHG | TEMPERATURE: 98.3 F | WEIGHT: 132 LBS | BODY MASS INDEX: 20.72 KG/M2 | HEART RATE: 114 BPM | DIASTOLIC BLOOD PRESSURE: 60 MMHG | OXYGEN SATURATION: 98 % | HEIGHT: 67 IN

## 2020-05-28 DIAGNOSIS — M79.661 PAIN OF RIGHT LOWER LEG: Primary | ICD-10-CM

## 2020-05-28 PROCEDURE — 99215 OFFICE O/P EST HI 40 MIN: CPT | Performed by: PHYSICIAN ASSISTANT

## 2020-05-28 ASSESSMENT — MIFFLIN-ST. JEOR: SCORE: 1267.38
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
## 2020-06-01 ENCOUNTER — NURSE TRIAGE (OUTPATIENT)
Dept: FAMILY MEDICINE | Facility: CLINIC | Age: 80
End: 2020-06-01

## 2020-06-01 ASSESSMENT — ENCOUNTER SYMPTOMS
RESPIRATORY NEGATIVE: 1
CONSTITUTIONAL NEGATIVE: 1
PSYCHIATRIC NEGATIVE: 1
EYES NEGATIVE: 1
NEUROLOGICAL NEGATIVE: 1
GASTROINTESTINAL NEGATIVE: 1
CARDIOVASCULAR NEGATIVE: 1

## 2020-06-01 NOTE — TELEPHONE ENCOUNTER
Patient's daughter reports pt is concerned he is taking one BP medication instead of two. Also notes PT Friday noted elevated BP reading of 160/70 and pulse 107. Daughter is unaware of any hypertensive symptoms but would like pt's BP checked today. Pt doen'st have BP monitor at home. Daughter would request order from PCP during telephone visit 06/02. Daughter will schedule BP check appointment at [facility] today.     Additional Information    Negative: Sounds like a life-threatening emergency to the triager    Negative: Pregnant > 20 weeks or postpartum (< 6 weeks after delivery) and new hand or face swelling    Negative: Pregnant > 20 weeks and BP > 140/90    Negative: Systolic BP >= 160 OR Diastolic >= 100, and any cardiac or neurologic symptoms (e.g., chest pain, difficulty breathing, unsteady gait, blurred vision)    Negative: Patient sounds very sick or weak to the triager    Negative: BP Systolic BP >= 140 OR Diastolic >= 90 and postpartum (from 0 to 6 weeks after delivery)    Negative: Systolic BP >= 180 OR Diastolic >= 110, and missed most recent dose of blood pressure medication    Negative: Systolic BP >= 180 OR Diastolic >= 110    Patient wants to be seen    Protocols used: HIGH BLOOD PRESSURE-A-OH

## 2020-06-01 NOTE — TELEPHONE ENCOUNTER
Reason for Call:  Other call back    Detailed comments: The patients daughter called and stated her dad has been very concerned about his high blood pressure lately. He was taking two medications for it in the past but was switched to only taking one. He has a home care nurse that comes out and the last time the nurse was out on 5/29 she was also concerned about his blood pressure. She would like a call back to discuss these concerns.     Phone Number Patient can be reached at: Other phone number: [phone]    Best Time: Any    Can we leave a detailed message on this number? YES    Call taken on 6/1/2020 at 12:28 PM by Narcisa Gomez

## 2020-06-02 ENCOUNTER — VIRTUAL VISIT (OUTPATIENT)
Dept: FAMILY MEDICINE | Facility: CLINIC | Age: 80
End: 2020-06-02
Payer: MEDICARE

## 2020-06-02 DIAGNOSIS — I10 BENIGN ESSENTIAL HYPERTENSION: Primary | ICD-10-CM

## 2020-06-02 DIAGNOSIS — I10 ESSENTIAL HYPERTENSION WITH GOAL BLOOD PRESSURE LESS THAN 140/90: ICD-10-CM

## 2020-06-02 PROCEDURE — 99443 ZZC PHYSICIAN TELEPHONE EVALUATION 21-30 MIN: CPT | Performed by: FAMILY MEDICINE

## 2020-06-02 RX ORDER — ATENOLOL 50 MG/1
50 TABLET ORAL DAILY
Qty: 30 TABLET | Refills: 12 | Status: SHIPPED | OUTPATIENT
Start: 2020-06-02 | End: 2020-09-23

## 2020-06-02 RX ORDER — AMLODIPINE BESYLATE 5 MG/1
5 TABLET ORAL AT BEDTIME
Qty: 30 TABLET | Refills: 11 | Status: SHIPPED | OUTPATIENT
Start: 2020-06-02 | End: 2020-09-23

## 2020-06-02 RX ORDER — ATENOLOL 50 MG/1
50 TABLET ORAL AT BEDTIME
Qty: 30 TABLET | Refills: 12 | Status: SHIPPED | OUTPATIENT
Start: 2020-06-02 | End: 2020-06-11
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
## 2020-06-03 ENCOUNTER — NURSE TRIAGE (OUTPATIENT)
Dept: NURSING | Facility: CLINIC | Age: 80
End: 2020-06-03

## 2020-06-03 DIAGNOSIS — I10 BENIGN ESSENTIAL HYPERTENSION: ICD-10-CM

## 2020-06-03 PROCEDURE — 36415 COLL VENOUS BLD VENIPUNCTURE: CPT | Performed by: FAMILY MEDICINE

## 2020-06-03 PROCEDURE — 80048 BASIC METABOLIC PNL TOTAL CA: CPT | Performed by: FAMILY MEDICINE

## 2020-06-03 NOTE — TELEPHONE ENCOUNTER
Erika calling, no JANE on file.   RN advised that without an JANE RN is unable to give information or confirm information regarding [facility] stated she will call back with patient tomorrow.    Ambar Guadarrama, RN   [facility] RN Triage     Additional Information    [1] Caller is not with the adult (patient) AND [2] probable NON-URGENT symptoms    Negative: Question about upcoming scheduled test, no triage required and triager able to answer question    Negative: General information question, no triage required and triager able to answer question    Negative: Health Information question, no triage required and triager able to answer question    Negative: Requesting regular office appointment    Negative: [1] Caller requesting NON-URGENT health information AND [2] PCP's office is the best resource    Negative: RN needs further essential information from caller in order to complete triage    Negative: [1] Caller is not with the adult (patient) AND [2] reporting urgent symptoms    Negative: Lab result questions    Negative: Medication questions    Negative: Caller can't be reached by phone    Negative: Caller has already spoken to PCP or another triager    Protocols used: INFORMATION ONLY CALL-A-

## 2020-06-04 LAB
ANION GAP SERPL CALCULATED.3IONS-SCNC: 8 MMOL/L (ref 3–14)
BUN SERPL-MCNC: 6 MG/DL (ref 7–30)
CALCIUM SERPL-MCNC: 8.6 MG/DL (ref 8.5–10.1)
CHLORIDE SERPL-SCNC: 107 MMOL/L (ref 94–109)
CO2 SERPL-SCNC: 24 MMOL/L (ref 20–32)
CREAT SERPL-MCNC: 0.7 MG/DL (ref 0.66–1.25)
GFR SERPL CREATININE-BSD FRML MDRD: 89 ML/MIN/{1.73_M2}
GLUCOSE SERPL-MCNC: 132 MG/DL (ref 70–99)
POTASSIUM SERPL-SCNC: 3.8 MMOL/L (ref 3.4–5.3)
SODIUM SERPL-SCNC: 139 MMOL/L (ref 133–144)

## 2020-06-05 ENCOUNTER — ANCILLARY PROCEDURE (OUTPATIENT)
Dept: GENERAL RADIOLOGY | Facility: CLINIC | Age: 80
End: 2020-06-05
Attending: FAMILY MEDICINE
Payer: MEDICARE

## 2020-06-05 ENCOUNTER — VIRTUAL VISIT (OUTPATIENT)
Dept: FAMILY MEDICINE | Facility: CLINIC | Age: 80
End: 2020-06-05
Payer: MEDICARE

## 2020-06-05 ENCOUNTER — PATIENT OUTREACH (OUTPATIENT)
Dept: GERIATRIC MEDICINE | Facility: CLINIC | Age: 80
End: 2020-06-05

## 2020-06-05 ENCOUNTER — TELEPHONE (OUTPATIENT)
Dept: FAMILY MEDICINE | Facility: CLINIC | Age: 80
End: 2020-06-05

## 2020-06-05 DIAGNOSIS — M79.604 BILATERAL LEG PAIN: ICD-10-CM

## 2020-06-05 DIAGNOSIS — G83.20: Primary | ICD-10-CM

## 2020-06-05 DIAGNOSIS — I10 ESSENTIAL HYPERTENSION WITH GOAL BLOOD PRESSURE LESS THAN 140/90: ICD-10-CM

## 2020-06-05 DIAGNOSIS — M79.661 PAIN OF RIGHT LOWER LEG: ICD-10-CM

## 2020-06-05 DIAGNOSIS — M79.605 BILATERAL LEG PAIN: ICD-10-CM

## 2020-06-05 DIAGNOSIS — M79.605 BILATERAL LEG PAIN: Primary | ICD-10-CM

## 2020-06-05 DIAGNOSIS — M79.604 BILATERAL LEG PAIN: Primary | ICD-10-CM

## 2020-06-05 DIAGNOSIS — G89.4 CHRONIC PAIN SYNDROME: ICD-10-CM

## 2020-06-05 PROCEDURE — 99443 ZZC PHYSICIAN TELEPHONE EVALUATION 21-30 MIN: CPT | Performed by: FAMILY MEDICINE

## 2020-06-05 PROCEDURE — 73610 X-RAY EXAM OF ANKLE: CPT | Mod: RT

## 2020-06-05 PROCEDURE — 73502 X-RAY EXAM HIP UNI 2-3 VIEWS: CPT | Mod: FY

## 2020-06-05 PROCEDURE — 73565 X-RAY EXAM OF KNEES: CPT | Mod: FY

## 2020-06-05 RX ORDER — LOSARTAN POTASSIUM 25 MG/1
25 TABLET ORAL AT BEDTIME
Qty: 90 TABLET | Refills: 3 | Status: SHIPPED | OUTPATIENT
Start: 2020-06-05 | End: 2020-07-28

## 2020-06-05 NOTE — TELEPHONE ENCOUNTER
Reason for Call:  Other call back    Detailed comments:   Balbina with [facility] called to say Petey says right leg pain has been getting worse.    Balbina outlined a pain management plan.  1,000 mg of acetaminophen 3 x day    Norco to use as needed for severe pain over 6/7 or more serious pain.      Not to exceed 4,000mg of acetaminophen daily total between the acetaminophen and Norco.      Phone Number Patient can be reached at: n/a  Balbina: [phone]    Best Time: any    Can we leave a detailed message on this number? YES    Call taken on 6/5/2020 at 3:33 PM by Thais Strauss

## 2020-06-05 NOTE — PROGRESS NOTES
[facility] Care Coordination Contact    Order placed with [facility] (p: [phone]; f: [phone]) for raised toilet seat w/arms.  Order placed on 6/5/20. Access updated.  As required, authorization submitted to health plan.  Helen Hernandez  Case Management Specialist  [facility]  [phone]
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
## 2020-06-08 ENCOUNTER — PATIENT OUTREACH (OUTPATIENT)
Dept: GERIATRIC MEDICINE | Facility: CLINIC | Age: 80
End: 2020-06-08

## 2020-06-08 ENCOUNTER — TELEPHONE (OUTPATIENT)
Dept: GERIATRIC MEDICINE | Facility: CLINIC | Age: 80
End: 2020-06-08

## 2020-06-08 DIAGNOSIS — R20.0 NUMBNESS IN FEET: ICD-10-CM

## 2020-06-08 DIAGNOSIS — G83.20: Primary | ICD-10-CM

## 2020-06-08 RX ORDER — TRAZODONE HYDROCHLORIDE 50 MG/1
TABLET, FILM COATED ORAL
COMMUNITY
Start: 2020-05-20

## 2020-06-08 NOTE — TELEPHONE ENCOUNTER
DME supply(s) have been requested by the OT on behalf of patient. DME orders(s) have been queued up and pended for the provider to review. OT reports the need for DME supplies due to recent hospitalization with COVID-19. Once completed, please route the encounter to encounter creator, Rafiq Davila to fax completed documentation and order requisition to [facility].     Rafiq Davila RN BSN N  [facility] Care Coordinator  [phone]  Fax:[phone]

## 2020-06-08 NOTE — PROGRESS NOTES
[facility] Care Coordination Contact    Received call from CLAIR Tunrer with [facility] stating that OT is recommending a transfer bench for client. Notified PCP and CMS.     Rafiq Davila RN BSN PHN  [facility] Care Coordinator  [phone]  Fax:[phone]

## 2020-06-08 NOTE — TELEPHONE ENCOUNTER
RN Kristin was called. She would like providers input if he agrees with pain management plan below or has any other recommendations. Nurse denies new leg symptoms and notes pain is chronic but keeping pt up at night.  Please advice. Ok to leave a detailed voice message with providers response.     Medication reconciliation completed during call.

## 2020-06-09 ENCOUNTER — TELEPHONE (OUTPATIENT)
Dept: FAMILY MEDICINE | Facility: CLINIC | Age: 80
End: 2020-06-09

## 2020-06-09 DIAGNOSIS — I10 HYPERTENSION GOAL BP (BLOOD PRESSURE) < 140/90: Primary | ICD-10-CM

## 2020-06-09 NOTE — PATIENT INSTRUCTIONS
PLEASE AUTHORIZE AND NOTIFY INVOLVED PARTY    WITH  REQUEST BELOW     Signed dme order     Cosign and fax    LALI HART JR., MD

## 2020-06-09 NOTE — TELEPHONE ENCOUNTER
Reason for Call:  Other Update    Detailed comments:   Balbina with [facility] called and stated that she has an update for the patient.  The patient's systolic B/P has been 140-150 and she is wondering if the patient can get a prescription for a blood pressure cuff so the patient can monitor at home.  The patient's pain has been feeling a burning pain in right leg and has been affected the patient's sleep and his walking.  Balbina is wondering if Dr. Feliciano would be willing to contact the patient's daughter with any changes or updates to the patient's plan of care and pain management.     Khaadra  [phone]     Phone Number Patient can be reached at: Other phone number:  [phone]    Best Time: Any    Can we leave a detailed message on this number? YES    Call taken on 6/9/2020 at 2:22 PM by Narcisa Ambriz

## 2020-06-09 NOTE — TELEPHONE ENCOUNTER
Spoke with patients family.     Would like this sent to [facility] [phone].       Blood pressure cuff order was faxed to [facility] in [place], [place]  Fax: [phone]      No further follow up needed.

## 2020-06-09 NOTE — TELEPHONE ENCOUNTER
PCP covering team:    1) Please approve for DME.  2) Advise on changes to plan of care. New virtual visit?    Triage:    1) Please find out where to fax DME  2) Please help schedule pt appt as advised by providers

## 2020-06-11 ENCOUNTER — TELEPHONE (OUTPATIENT)
Dept: FAMILY MEDICINE | Facility: CLINIC | Age: 80
End: 2020-06-11

## 2020-06-11 RX ORDER — ACETAMINOPHEN 500 MG
500-1000 TABLET ORAL EVERY 6 HOURS PRN
Qty: 120 TABLET | Refills: 11 | Status: SHIPPED | OUTPATIENT
Start: 2020-06-11

## 2020-06-11 NOTE — TELEPHONE ENCOUNTER
This is ok     PLEASE AUTHORIZE AND NOTIFY INVOLVED PARTY    WITH  REQUEST BELOW     LALI HART JR., MD     Tylenol not to exceed 500mg po qid     Lali Hart Jr., MD

## 2020-06-11 NOTE — TELEPHONE ENCOUNTER
Kristin was called with providers response. PCP agreed with suggested pain management plan. Doctor Feliciano also noted -Tylenol not to exceed 500mg po qid. Asked RN to call triage back if further questions.

## 2020-06-11 NOTE — PATIENT INSTRUCTIONS
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

## 2020-06-11 NOTE — TELEPHONE ENCOUNTER
Reason for Call:  Request for results:  Name of test or procedure: xray  Date of test of procedure: 06/05/2020  Location of the test or procedure: [facility] to leave the result message on voice mail or with a family member? YES  Phone number Patient can be reached at:  Other phone number:  [phone]  Additional comments: any  Call taken on 6/11/2020 at 2:15 PM by EN MARIE

## 2020-06-11 NOTE — TELEPHONE ENCOUNTER
Called pt's daughter Erika with x-ray results. Informed her letter was mailed with this information.  She was also given number to [facility] [phone].No further action needed at this time.

## 2020-06-15 ENCOUNTER — NURSE TRIAGE (OUTPATIENT)
Dept: FAMILY MEDICINE | Facility: CLINIC | Age: 80
End: 2020-06-15

## 2020-06-15 NOTE — TELEPHONE ENCOUNTER
Reason for call:  Patient reporting a symptom    Symptom or request:   Elev blood pressure   160/??    Duration (how long have symptoms been present): Since hospital discharge     Have you been treated for this before? Yes    Additional comments:    Medication was adjusted two weeks ago and BP is not going down    Phone Number patient can be reached at: Erika  daughter    Best Time:  anytme    Can we leave a detailed message on this number:  YES    Call taken on 6/15/2020 at 3:35 PM by LEFTY MEDINA

## 2020-06-15 NOTE — TELEPHONE ENCOUNTER
Pt's daughter Erika was called. Reports today pt's diastolic BP readings was 160. She placed triage on hold to call her sister who lives with pt. She wanted to get more details of pt's his vitals but call was disconnected.  On call back, please get more information on symptoms.

## 2020-06-15 NOTE — TELEPHONE ENCOUNTER
"RN called back to Erika.    06/08- 152/76  06/09 158/78  06/10- 158/78  06/12- 160/82  06/15- 160/78    Recent medication changes done on 06/02/20:        Med change on 06/05:            Providers please advise:    Family would like to get advice on med adjustments and then follow ups.      Additional Information    Negative: Sounds like a life-threatening emergency to the triager    Negative: Pregnant > 20 weeks or postpartum (< 6 weeks after delivery) and new hand or face swelling    Negative: Pregnant > 20 weeks and BP > 140/90    Negative: Systolic BP >= 160 OR Diastolic >= 100, and any cardiac or neurologic symptoms (e.g., chest pain, difficulty breathing, unsteady gait, blurred vision)    Negative: Patient sounds very sick or weak to the triager    Negative: BP Systolic BP >= 140 OR Diastolic >= 90 and postpartum (from 0 to 6 weeks after delivery)    Negative: Systolic BP >= 180 OR Diastolic >= 110, and missed most recent dose of blood pressure medication    Negative: Systolic BP >= 180 OR Diastolic >= 110    Negative: Patient wants to be seen    Negative: Ran out of BP medications    Negative: Taking BP medications and feels is having side effects (e.g., impotence, cough, dizziness)    Negative: Systolic BP >= 160 OR Diastolic >= 100    Negative: Systolic BP >= 130 OR Diastolic >= 80, and pregnant    Systolic BP >= 130 OR Diastolic >= 80, and is taking BP medications    Answer Assessment - Initial Assessment Questions  1. BLOOD PRESSURE: \"What is the blood pressure?\" \"Did you take at least two measurements 5 minutes apart?\"      Please see note    2. ONSET: \"When did you take your blood pressure?\"      Home care takes it    3. HOW: \"How did you obtain the blood pressure?\" (e.g., visiting nurse, automatic home BP monitor)      Visiting nurse    4. HISTORY: \"Do you have a history of high blood pressure?\"      Yes    5. MEDICATIONS: \"Are you taking any medications for blood pressure?\" \"Have you missed any doses " "recently?\"      No    6. OTHER SYMPTOMS: \"Do you have any symptoms?\" (e.g., headache, chest pain, blurred vision, difficulty breathing, weakness)      No sx    7. PREGNANCY: \"Is there any chance you are pregnant?\" \"When was your last menstrual period?\"      No    Protocols used: HIGH BLOOD PRESSURE-A-OH      "

## 2020-06-16 NOTE — TELEPHONE ENCOUNTER
Patient Contact    Attempt # 1    Was call answered?  No.  Left message on voicemail with information to call triage back.    On call back - relay provider messages below.   1. Increase losartan to 50 mg.  2. Follow-up in 2 weeks. (Schedule virtual visit)

## 2020-06-18 DIAGNOSIS — G62.9 PERIPHERAL POLYNEUROPATHY: ICD-10-CM

## 2020-06-18 RX ORDER — GABAPENTIN 300 MG/1
300 CAPSULE ORAL 3 TIMES DAILY
Qty: 90 CAPSULE | Refills: 0 | Status: SHIPPED | OUTPATIENT
Start: 2020-06-18 | End: 2020-07-16

## 2020-06-23 DIAGNOSIS — K21.9 GASTROESOPHAGEAL REFLUX DISEASE WITHOUT ESOPHAGITIS: ICD-10-CM

## 2020-06-23 RX ORDER — NICOTINE POLACRILEX 4 MG/1
20 GUM, CHEWING ORAL DAILY
Qty: 90 TABLET | Refills: 3 | Status: SHIPPED | OUTPATIENT
Start: 2020-06-23 | End: 2020-09-23

## 2020-06-24 ENCOUNTER — OFFICE VISIT (OUTPATIENT)
Dept: FAMILY MEDICINE | Facility: CLINIC | Age: 80
End: 2020-06-24
Payer: MEDICARE

## 2020-06-24 ENCOUNTER — TELEPHONE (OUTPATIENT)
Dept: FAMILY MEDICINE | Facility: CLINIC | Age: 80
End: 2020-06-24

## 2020-06-24 ENCOUNTER — APPOINTMENT (OUTPATIENT)
Dept: INTERPRETER SERVICES | Facility: CLINIC | Age: 80
End: 2020-06-24
Payer: MEDICARE

## 2020-06-24 VITALS
DIASTOLIC BLOOD PRESSURE: 78 MMHG | HEIGHT: 67 IN | WEIGHT: 142 LBS | BODY MASS INDEX: 22.29 KG/M2 | SYSTOLIC BLOOD PRESSURE: 140 MMHG | OXYGEN SATURATION: 100 % | RESPIRATION RATE: 20 BRPM | TEMPERATURE: 98 F | HEART RATE: 72 BPM

## 2020-06-24 DIAGNOSIS — I10 BENIGN ESSENTIAL HYPERTENSION: Primary | ICD-10-CM

## 2020-06-24 PROCEDURE — 99213 OFFICE O/P EST LOW 20 MIN: CPT | Performed by: FAMILY MEDICINE

## 2020-06-24 ASSESSMENT — MIFFLIN-ST. JEOR: SCORE: 1312.74

## 2020-06-24 NOTE — TELEPHONE ENCOUNTER
FYI-  Pt's daughter Erika was called. Per chart review, pt was to increase losartan to 50 mg tablet daily. Daughter doesn't know if sibling is setting up right dose for pt at night. Notes she was told to have pt follow up in two weeks. She would like to schedule appt for pt. Daughter was offerred a virtual visit. She asked for and OV. F2F visit was scheduled. No further action needed unless appt change is needed.

## 2020-06-24 NOTE — PROGRESS NOTES
Subjective     Petey Levy is a 80 year old male who presents to clinic today for the following health issues:    Pt here with his daughter, she helps interpret for Pt    HPI   Hypertension Follow-up      Do you check your blood pressure regularly outside of the clinic? Yes     Are you following a low salt diet? Yes    Are your blood pressures ever more than 140 on the top number (systolic) OR more   than 90 on the bottom number (diastolic), for example 140/90? Yes    Pt has been taking Losartan 50 mg (2 of the 25 mg tablets) just for past 2 days    How many servings of fruits and vegetables do you eat daily?  0-1    On average, how many sweetened beverages do you drink each day (Examples: soda, juice, sweet tea, etc.  Do NOT count diet or artificially sweetened beverages)?   0    How many days per week do you exercise enough to make your heart beat faster? 3 or less    How many minutes a day do you exercise enough to make your heart beat faster? 20 - 29    How many days per week do you miss taking your medication? 0    Patient has concerns re: his home BP machine. 161/84 and 148/79 both 6/22/2020 with home care nurse/OT.    Pt checks BP with his own cuff here Lt arm 167/88        No Known Allergies  BP Readings from Last 3 Encounters:   06/24/20 (!) 140/78   05/28/20 120/60   05/27/20 (!) 167/91    Wt Readings from Last 3 Encounters:   06/24/20 64.4 kg (142 lb)   05/28/20 59.9 kg (132 lb)   03/09/20 67.1 kg (148 lb)                      Reviewed and updated as needed this visit by Provider         Review of Systems   CONSTITUTIONAL: NEGATIVE for fever, chills, change in weight  ENT/MOUTH: NEGATIVE for ear, mouth and throat problems  RESP: NEGATIVE for significant cough or SOB  CV: NEGATIVE for chest pain, palpitations or peripheral edema      Objective    BP (!) 140/78 (BP Location: Left arm, Patient Position: Sitting, Cuff Size: Adult Regular)   Pulse 72   Temp 98  F (36.7  C)   Resp 20   Ht 1.702 m  "(5' 7\")   Wt 64.4 kg (142 lb)   SpO2 100%   BMI 22.24 kg/m    Body mass index is 22.24 kg/m .  Physical Exam   GENERAL: healthy, alert and no distress  NECK: no adenopathy, no asymmetry, masses, or scars and thyroid normal to palpation  RESP: lungs clear to auscultation - no rales, rhonchi or wheezes  CV: regular rate and rhythm, normal S1 S2, no S3 or S4, no murmur, click or rub, no peripheral edema and peripheral pulses strong  ABDOMEN: soft, nontender, no hepatosplenomegaly, no masses and bowel sounds normal    Diagnostic Test Results:  Labs reviewed in Epic  none         Assessment & Plan     Petey was seen today for hypertension.    Diagnoses and all orders for this visit:    Benign essential hypertension           FUTURE APPOINTMENTS:       - Follow-up visit in 2 weeks  Patient Instructions   Continue taking the Losartan 50 mg daily and the other blood pressure medications  Need to replace home blood pressure cuff      Return in about 2 weeks (around 7/8/2020) for Hypertension.    Nacho Peres MD  [facility]      "

## 2020-06-24 NOTE — TELEPHONE ENCOUNTER
Reason for Call:  Other FYI    Detailed comments: Connie with [facility] called and stated that she saw the patient on 6/23 and the patient's B/P was 160/80, HR- 67, O2- 98.  She stated that the patient's B/P has been going up and down with PT and OT as he continues to take the same medication.  If there is any new orders that need to be placed please call the patient's daughter Erika.     Phone Number Patient can be reached at: Other phone number:  [phone]    Best Time: Any      Can we leave a detailed message on this number? YES    Call taken on 6/24/2020 at 9:24 AM by Narcisa Ambriz

## 2020-06-24 NOTE — PATIENT INSTRUCTIONS
Continue taking the Losartan 50 mg daily and the other blood pressure medications  Need to replace home blood pressure cuff

## 2020-06-29 ENCOUNTER — TELEPHONE (OUTPATIENT)
Dept: FAMILY MEDICINE | Facility: CLINIC | Age: 80
End: 2020-06-29

## 2020-06-29 NOTE — TELEPHONE ENCOUNTER
Reason for Call:  Other FYI    Detailed comments: Kristy with [facility] called and stated that the patient's B/p was elevated today.  Today's reading was done with a [facility] Manual B/P cuff and it read 158/66 at rest and then as she got him moving around it was rechecked and it was 168/70.  The patient rested for 5-10 mins and it came down to 160/64.  She just wanted Dr. Asif Feliciano to be aware of these numbers.  Kristy spoke with the patient's daughter and she stated that they need to take their home blood cuff to CME to get recalibrated or to get a new one.  They have not been able to do this yet. Erika, the patient's daughter would like to be called with any changes or orders.     Phone Number Patient can be reached at: Other phone number:  [phone]    Best Time: Any    Can we leave a detailed message on this number? YES    Call taken on 6/29/2020 at 3:22 PM by Narcisa Ambriz

## 2020-07-06 ENCOUNTER — OFFICE VISIT (OUTPATIENT)
Dept: FAMILY MEDICINE | Facility: CLINIC | Age: 80
End: 2020-07-06
Payer: MEDICARE

## 2020-07-06 VITALS
SYSTOLIC BLOOD PRESSURE: 142 MMHG | BODY MASS INDEX: 22.71 KG/M2 | HEART RATE: 74 BPM | OXYGEN SATURATION: 100 % | WEIGHT: 145 LBS | TEMPERATURE: 97.8 F | RESPIRATION RATE: 16 BRPM | DIASTOLIC BLOOD PRESSURE: 70 MMHG

## 2020-07-06 DIAGNOSIS — I10 BENIGN ESSENTIAL HYPERTENSION: Primary | ICD-10-CM

## 2020-07-06 DIAGNOSIS — M79.89 LEG SWELLING: ICD-10-CM

## 2020-07-06 PROCEDURE — 84450 TRANSFERASE (AST) (SGOT): CPT | Performed by: PHYSICIAN ASSISTANT

## 2020-07-06 PROCEDURE — 36415 COLL VENOUS BLD VENIPUNCTURE: CPT | Performed by: PHYSICIAN ASSISTANT

## 2020-07-06 PROCEDURE — 99214 OFFICE O/P EST MOD 30 MIN: CPT | Performed by: PHYSICIAN ASSISTANT

## 2020-07-06 PROCEDURE — 84460 ALANINE AMINO (ALT) (SGPT): CPT | Performed by: PHYSICIAN ASSISTANT

## 2020-07-06 ASSESSMENT — ENCOUNTER SYMPTOMS
CONSTITUTIONAL NEGATIVE: 1
GASTROINTESTINAL NEGATIVE: 1
NEUROLOGICAL NEGATIVE: 1
PSYCHIATRIC NEGATIVE: 1
EYES NEGATIVE: 1
RESPIRATORY NEGATIVE: 1
CARDIOVASCULAR NEGATIVE: 1
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
## 2020-07-07 LAB
ALT SERPL W P-5'-P-CCNC: 24 U/L (ref 0–70)
AST SERPL W P-5'-P-CCNC: 14 U/L (ref 0–45)

## 2020-07-07 NOTE — RESULT ENCOUNTER NOTE
Mohamed    Your lab tests are complete and I have reviewed the results.     - Your lab results look great; everything is normal.  Labs show normal liver function (AST/ALT)    If you have any questions or concerns, please feel free to call or send a [employer] message.    Sincerely,  Ben Monk PA-C

## 2020-07-09 NOTE — PROGRESS NOTES
Per [facility], CC notified.  Petey Levy 1940 raised toilet seat w/arms 6/5/2020 DELIVERED   Petey Levy 1940 transfer bench 6/5/2020 DELIVERED     Helen Hernandez  Case Management Specialist  [facility]  [phone]

## 2020-07-15 ENCOUNTER — PATIENT OUTREACH (OUTPATIENT)
Dept: GERIATRIC MEDICINE | Facility: CLINIC | Age: 80
End: 2020-07-15

## 2020-07-15 DIAGNOSIS — G62.9 PERIPHERAL POLYNEUROPATHY: ICD-10-CM

## 2020-07-15 NOTE — PROGRESS NOTES
[facility] Care Coordination Contact    Client called and reported that he had received a letter from [facility] stating Medicare has not processed the payment for his hospital stay on 4/20/20. Requested client to fax the letter. Writer called [facility] and with Namita.  Writer was told that the payment was not made due to client's recent name change and hasn't been updated in the Medicare system. Namita stated  daughter called 7/10/20 to discuss this and was supposed to call back after she clears things up with Medicare. Called client back and updated him on Care Coordinator's telephone conversation with the [facility] rep.    Rafiq Davila RN BSN PHN  [facility] Care Coordinator  [phone]  Fax:[phone]

## 2020-07-16 RX ORDER — GABAPENTIN 300 MG/1
300 CAPSULE ORAL 3 TIMES DAILY
Qty: 270 CAPSULE | Refills: 0 | Status: SHIPPED | OUTPATIENT
Start: 2020-07-16 | End: 2020-09-23 | Stop reason: DRUGHIGH

## 2020-07-28 ENCOUNTER — TELEPHONE (OUTPATIENT)
Dept: FAMILY MEDICINE | Facility: CLINIC | Age: 80
End: 2020-07-28

## 2020-07-28 DIAGNOSIS — I10 ESSENTIAL HYPERTENSION WITH GOAL BLOOD PRESSURE LESS THAN 140/90: ICD-10-CM

## 2020-07-28 RX ORDER — LOSARTAN POTASSIUM 50 MG/1
50 TABLET ORAL AT BEDTIME
Qty: 90 TABLET | Refills: 1 | Status: SHIPPED | OUTPATIENT
Start: 2020-07-28 | End: 2020-09-23

## 2020-07-28 NOTE — TELEPHONE ENCOUNTER
losartan (COZAAR) 25 MG tablet  Patient's daughter states he is to be taking 50 mg of losartan, please send new rx if appropriate

## 2020-09-23 ENCOUNTER — OFFICE VISIT (OUTPATIENT)
Dept: FAMILY MEDICINE | Facility: CLINIC | Age: 80
End: 2020-09-23
Payer: MEDICARE

## 2020-09-23 VITALS
OXYGEN SATURATION: 97 % | HEART RATE: 69 BPM | TEMPERATURE: 96.6 F | DIASTOLIC BLOOD PRESSURE: 66 MMHG | BODY MASS INDEX: 24.12 KG/M2 | WEIGHT: 154 LBS | SYSTOLIC BLOOD PRESSURE: 122 MMHG

## 2020-09-23 DIAGNOSIS — E55.9 VITAMIN D DEFICIENCY: ICD-10-CM

## 2020-09-23 DIAGNOSIS — E78.5 HYPERLIPIDEMIA LDL GOAL <100: ICD-10-CM

## 2020-09-23 DIAGNOSIS — I10 ESSENTIAL HYPERTENSION WITH GOAL BLOOD PRESSURE LESS THAN 140/90: ICD-10-CM

## 2020-09-23 DIAGNOSIS — Z00.00 ENCOUNTER FOR MEDICARE ANNUAL WELLNESS EXAM: Primary | ICD-10-CM

## 2020-09-23 DIAGNOSIS — G62.9 PERIPHERAL POLYNEUROPATHY: ICD-10-CM

## 2020-09-23 DIAGNOSIS — K21.9 GASTROESOPHAGEAL REFLUX DISEASE WITHOUT ESOPHAGITIS: ICD-10-CM

## 2020-09-23 DIAGNOSIS — Z23 ENCOUNTER FOR IMMUNIZATION: ICD-10-CM

## 2020-09-23 DIAGNOSIS — I10 BENIGN ESSENTIAL HYPERTENSION: ICD-10-CM

## 2020-09-23 LAB
ERYTHROCYTE [DISTWIDTH] IN BLOOD BY AUTOMATED COUNT: 12.5 % (ref 10–15)
HCT VFR BLD AUTO: 41.6 % (ref 40–53)
HGB BLD-MCNC: 14.5 G/DL (ref 13.3–17.7)
MCH RBC QN AUTO: 30.6 PG (ref 26.5–33)
MCHC RBC AUTO-ENTMCNC: 34.9 G/DL (ref 31.5–36.5)
MCV RBC AUTO: 88 FL (ref 78–100)
PLATELET # BLD AUTO: 285 10E9/L (ref 150–450)
RBC # BLD AUTO: 4.74 10E12/L (ref 4.4–5.9)
WBC # BLD AUTO: 7.3 10E9/L (ref 4–11)

## 2020-09-23 PROCEDURE — G0439 PPPS, SUBSEQ VISIT: HCPCS | Performed by: FAMILY MEDICINE

## 2020-09-23 PROCEDURE — 99213 OFFICE O/P EST LOW 20 MIN: CPT | Mod: 25 | Performed by: FAMILY MEDICINE

## 2020-09-23 PROCEDURE — 80061 LIPID PANEL: CPT | Performed by: FAMILY MEDICINE

## 2020-09-23 PROCEDURE — 80053 COMPREHEN METABOLIC PANEL: CPT | Performed by: FAMILY MEDICINE

## 2020-09-23 PROCEDURE — 90714 TD VACC NO PRESV 7 YRS+ IM: CPT | Performed by: FAMILY MEDICINE

## 2020-09-23 PROCEDURE — 85027 COMPLETE CBC AUTOMATED: CPT | Performed by: FAMILY MEDICINE

## 2020-09-23 PROCEDURE — 90471 IMMUNIZATION ADMIN: CPT | Performed by: FAMILY MEDICINE

## 2020-09-23 PROCEDURE — 36415 COLL VENOUS BLD VENIPUNCTURE: CPT | Performed by: FAMILY MEDICINE

## 2020-09-23 RX ORDER — NICOTINE POLACRILEX 4 MG/1
20 GUM, CHEWING ORAL DAILY
Qty: 90 TABLET | Refills: 3 | Status: SHIPPED | OUTPATIENT
Start: 2020-09-23

## 2020-09-23 RX ORDER — LOSARTAN POTASSIUM 50 MG/1
50 TABLET ORAL AT BEDTIME
Qty: 90 TABLET | Refills: 1 | Status: SHIPPED | OUTPATIENT
Start: 2020-09-23 | End: 2020-10-20

## 2020-09-23 RX ORDER — GABAPENTIN 100 MG/1
100 CAPSULE ORAL 3 TIMES DAILY
Qty: 270 CAPSULE | Refills: 0 | Status: SHIPPED | OUTPATIENT
Start: 2020-09-23

## 2020-09-23 RX ORDER — ATORVASTATIN CALCIUM 10 MG/1
10 TABLET, FILM COATED ORAL DAILY
Qty: 90 TABLET | Refills: 3 | Status: SHIPPED | OUTPATIENT
Start: 2020-09-23 | End: 2020-10-20

## 2020-09-23 RX ORDER — ATENOLOL 50 MG/1
50 TABLET ORAL DAILY
Qty: 90 TABLET | Refills: 3 | Status: SHIPPED | OUTPATIENT
Start: 2020-09-23 | End: 2020-10-20

## 2020-09-23 RX ORDER — CHOLECALCIFEROL (VITAMIN D3) 50 MCG
1 TABLET ORAL DAILY
Qty: 90 TABLET | Refills: 3 | Status: SHIPPED | OUTPATIENT
Start: 2020-09-23

## 2020-09-23 RX ORDER — AMLODIPINE BESYLATE 5 MG/1
5 TABLET ORAL AT BEDTIME
Qty: 90 TABLET | Refills: 3 | Status: SHIPPED | OUTPATIENT
Start: 2020-09-23 | End: 2020-10-20

## 2020-09-23 ASSESSMENT — ACTIVITIES OF DAILY LIVING (ADL)
CURRENT_FUNCTION: LAUNDRY REQUIRES ASSISTANCE
CURRENT_FUNCTION: TRANSPORTATION REQUIRES ASSISTANCE
CURRENT_FUNCTION: HOUSEWORK REQUIRES ASSISTANCE
CURRENT_FUNCTION: PREPARING MEALS REQUIRES ASSISTANCE
CURRENT_FUNCTION: SHOPPING REQUIRES ASSISTANCE
CURRENT_FUNCTION: BATHING REQUIRES ASSISTANCE
CURRENT_FUNCTION: MEDICATION ADMINISTRATION REQUIRES ASSISTANCE

## 2020-09-23 NOTE — PROGRESS NOTES
"SUBJECTIVE:   Petey Levy is a 80 year old male who presents for Preventive Visit.    Pt here with his daughter.  She acts as  for him as needed.  He does understand English fairly well    Patient has been advised of split billing requirements and indicates understanding: Yes   Are you in the first 12 months of your Medicare coverage?  No    Healthy Habits:     In general, how would you rate your overall health?  Fair    Frequency of exercise:  2-3 days/week    Duration of exercise:  15-30 minutes    Do you usually eat at least 4 servings of fruit and vegetables a day, include whole grains    & fiber and avoid regularly eating high fat or \"junk\" foods?  Yes    Taking medications regularly:  Yes    Medication side effects:  None    Ability to successfully perform activities of daily living:  Transportation requires assistance, shopping requires assistance, preparing meals requires assistance, housework requires assistance, bathing requires assistance, laundry requires assistance and medication administration requires assistance    Home Safety:  No safety concerns identified    Hearing Impairment:  Difficulty following a conversation in a noisy restaurant or crowded room, feel that people are mumbling or not speaking clearly and find that men's voices are easier to understand than woman's    In the past 6 months, have you been bothered by leaking of urine?  No    In general, how would you rate your overall mental or emotional health?  Fair      PHQ-2 Total Score: 0    Additional concerns today:  No    Do you feel safe in your environment? Yes    Have you ever done Advance Care Planning? (For example, a Health Directive, POLST, or a discussion with a medical provider or your loved ones about your wishes): No, advance care planning information given to patient to review.  Patient plans to discuss their wishes with loved ones or provider.        Fall risk  Fallen 2 or more times in the past year?: " No  Any fall with injury in the past year?: No    Cognitive Screening Not appropriate due to language barrier    Do you have sleep apnea, excessive snoring or daytime drowsiness?: yes    Reviewed and updated as needed this visit by clinical staff  Tobacco  Allergies  Meds  Med Hx  Surg Hx  Fam Hx  Soc Hx        Reviewed and updated as needed this visit by Provider        Social History     Tobacco Use     Smoking status: Former Smoker     Last attempt to quit: 6/10/2012     Years since quittin.2     Smokeless tobacco: Never Used   Substance Use Topics     Alcohol use: No     If you drink alcohol do you typically have >3 drinks per day or >7 drinks per week? No    Alcohol Use 2020   Prescreen: >3 drinks/day or >7 drinks/week? Not Applicable   Prescreen: >3 drinks/day or >7 drinks/week? -           Hyperlipidemia Follow-Up      Are you regularly taking any medication or supplement to lower your cholesterol?   Yes- Atorvastatin     Are you having muscle aches or other side effects that you think could be caused by your cholesterol lowering medication?  No    Hypertension Follow-up      Do you check your blood pressure regularly outside of the clinic? No     Are you following a low salt diet? Yes    Are your blood pressures ever more than 140 on the top number (systolic) OR more   than 90 on the bottom number (diastolic), for example 140/90? No     Pt asks to have the Gabapentin dose decreased  He is not taking the Hydrocodone/APAP any longer      Current providers sharing in care for this patient include:   Patient Care Team:  Asif Feliciano MD as PCP - General (Family Practice)  Rafiq Davila RN as Lead Care Coordinator  Rosana Monk PA-C as Assigned PCP    The following health maintenance items are reviewed in Epic and correct as of today:  Health Maintenance   Topic Date Due     URINE DRUG SCREEN  1940     PNEUMOCOCCAL IMMUNIZATION 65+ LOW/MEDIUM RISK (2 of 2 - PCV13)  "11/16/2007     ZOSTER IMMUNIZATION (2 of 3) 01/20/2016     DTAP/TDAP/TD IMMUNIZATION (3 - Td) 03/23/2020     ADVANCE CARE PLANNING  08/25/2020     MEDICARE ANNUAL WELLNESS VISIT  09/17/2020     FALL RISK ASSESSMENT  03/31/2021     LIPID  03/09/2025     PHQ-2  Completed     IPV IMMUNIZATION  Aged Out     MENINGITIS IMMUNIZATION  Aged Out     HEPATITIS B IMMUNIZATION  Aged Out     INFLUENZA VACCINE  Discontinued     Lab work is in process  Labs reviewed in EPIC      Review of Systems  CONSTITUTIONAL: NEGATIVE for fever, chills, change in weight  INTEGUMENTARY/SKIN: NEGATIVE for worrisome rashes, moles or lesions  EYES: NEGATIVE for vision changes or irritation  ENT/MOUTH: NEGATIVE for ear, mouth and throat problems  RESP: NEGATIVE for significant cough or SOB  BREAST: NEGATIVE for masses, tenderness or discharge  CV: NEGATIVE for chest pain, palpitations or peripheral edema  GI: NEGATIVE for nausea, abdominal pain, heartburn, or change in bowel habits  : NEGATIVE for frequency, dysuria, or hematuria  MUSCULOSKELETAL: NEGATIVE for significant arthralgias or myalgia  NEURO: POSITIVE for numbness or tingling both feet and weakness Rt arm  ENDOCRINE: NEGATIVE for temperature intolerance, skin/hair changes  HEME: NEGATIVE for bleeding problems  PSYCHIATRIC: NEGATIVE for changes in mood or affect    OBJECTIVE:   /66 (Cuff Size: Adult Regular)   Pulse 69   Temp 96.6  F (35.9  C) (Tympanic)   Wt 69.9 kg (154 lb)   SpO2 97%   BMI 24.12 kg/m   Estimated body mass index is 24.12 kg/m  as calculated from the following:    Height as of 6/24/20: 1.702 m (5' 7\").    Weight as of this encounter: 69.9 kg (154 lb).  Physical Exam  GENERAL: healthy, alert and no distress  EYES: Eyes grossly normal to inspection, PERRL and conjunctivae and sclerae normal  HENT: ear canals and TM's normal, nose and mouth without ulcers or lesions  NECK: no adenopathy, no asymmetry, masses, or scars and thyroid normal to palpation  RESP: lungs " clear to auscultation - no rales, rhonchi or wheezes  CV: regular rate and rhythm, normal S1 S2, no S3 or S4, no murmur, click or rub, no peripheral edema and peripheral pulses strong  ABDOMEN: soft, nontender, no hepatosplenomegaly, no masses and bowel sounds normal   (male): Deferred  RECTAL: deferred  MS: no gross musculoskeletal defects noted, no edema  SKIN: no suspicious lesions or rashes  NEURO: Normal strength and tone, mentation intact and speech normal  PSYCH: mentation appears normal, affect normal/bright    Diagnostic Test Results:  [facility]  Lab: see below, results pending    ASSESSMENT / PLAN:   Petey was seen today for physical.    Diagnoses and all orders for this visit:    Encounter for Medicare annual wellness exam  -     CBC with platelets    Benign essential hypertension  -     amLODIPine (NORVASC) 5 MG tablet; Take 1 tablet (5 mg) by mouth At Bedtime  -     Comprehensive metabolic panel    Gastroesophageal reflux disease without esophagitis  -     omeprazole 20 MG tablet; Take 1 tablet (20 mg) by mouth daily    Peripheral polyneuropathy  -     gabapentin (NEURONTIN) 100 MG capsule; Take 1 capsule (100 mg) by mouth 3 times daily    Vitamin D deficiency  -     vitamin D3 (CHOLECALCIFEROL) 50 mcg (2000 units) tablet; Take 1 tablet (50 mcg) by mouth daily    Essential hypertension with goal blood pressure less than 140/90  -     losartan (COZAAR) 50 MG tablet; Take 1 tablet (50 mg) by mouth At Bedtime  -     atenolol (TENORMIN) 50 MG tablet; Take 1 tablet (50 mg) by mouth daily    Hyperlipidemia LDL goal <100  -     atorvastatin (LIPITOR) 10 MG tablet; Take 1 tablet (10 mg) by mouth daily  -     Lipid panel reflex to direct LDL Fasting    Encounter for immunization  -     TD (ADULT, 7+) PRESERVE FREE  -     VACCINE ADMINISTRATION, INITIAL        Patient has been advised of split billing requirements and indicates understanding: Yes  COUNSELING:  Reviewed preventive health  "counseling, as reflected in patient instructions       Regular exercise       Healthy diet/nutrition       Fall risk prevention       Immunizations    Vaccinated for: Td          Estimated body mass index is 24.12 kg/m  as calculated from the following:    Height as of 6/24/20: 1.702 m (5' 7\").    Weight as of this encounter: 69.9 kg (154 lb).        He reports that he quit smoking about 8 years ago. He has never used smokeless tobacco.      Appropriate preventive services were discussed with this patient, including applicable screening as appropriate for cardiovascular disease, diabetes, osteopenia/osteoporosis, and glaucoma.  As appropriate for age/gender, discussed screening for colorectal cancer, prostate cancer, breast cancer, and cervical cancer. Checklist reviewing preventive services available has been given to the patient.    Reviewed patients plan of care and provided an AVS. The Basic Care Plan (routine screening as documented in Health Maintenance) for Petey meets the Care Plan requirement. This Care Plan has been established and reviewed with the Patient and daughter.    Counseling Resources:  ATP IV Guidelines  Pooled Cohorts Equation Calculator  Breast Cancer Risk Calculator  Breast Cancer: Medication to Reduce Risk  FRAX Risk Assessment  ICSI Preventive Guidelines  Dietary Guidelines for Americans, 2010  [employer]'s MyPlate  ASA Prophylaxis  Lung CA Screening    Nacho Peres MD  [facility]    Identified Health Risks:  "

## 2020-09-23 NOTE — PATIENT INSTRUCTIONS
Patient Education   Personalized Prevention Plan  You are due for the preventive services outlined below.  Your care team is available to assist you in scheduling these services.  If you have already completed any of these items, please share that information with your care team to update in your medical record.  Health Maintenance Due   Topic Date Due     URINE DRUG SCREEN  1940     Pneumococcal Vaccine (2 of 2 - PCV13) 11/16/2007     Zoster (Shingles) Vaccine (2 of 3) 01/20/2016     Diptheria Tetanus Pertussis (DTAP/TDAP/TD) Vaccine (3 - Td) 03/23/2020     Discuss Advance Care Planning  08/25/2020     Annual Wellness Visit  09/17/2020

## 2020-09-24 LAB
ALBUMIN SERPL-MCNC: 3.6 G/DL (ref 3.4–5)
ALP SERPL-CCNC: 79 U/L (ref 40–150)
ALT SERPL W P-5'-P-CCNC: 25 U/L (ref 0–70)
ANION GAP SERPL CALCULATED.3IONS-SCNC: 6 MMOL/L (ref 3–14)
AST SERPL W P-5'-P-CCNC: 17 U/L (ref 0–45)
BILIRUB SERPL-MCNC: 0.4 MG/DL (ref 0.2–1.3)
BUN SERPL-MCNC: 16 MG/DL (ref 7–30)
CALCIUM SERPL-MCNC: 9 MG/DL (ref 8.5–10.1)
CHLORIDE SERPL-SCNC: 109 MMOL/L (ref 94–109)
CHOLEST SERPL-MCNC: 198 MG/DL
CO2 SERPL-SCNC: 25 MMOL/L (ref 20–32)
CREAT SERPL-MCNC: 1.09 MG/DL (ref 0.66–1.25)
GFR SERPL CREATININE-BSD FRML MDRD: 63 ML/MIN/{1.73_M2}
GLUCOSE SERPL-MCNC: 88 MG/DL (ref 70–99)
HDLC SERPL-MCNC: 55 MG/DL
LDLC SERPL CALC-MCNC: 109 MG/DL
NONHDLC SERPL-MCNC: 143 MG/DL
POTASSIUM SERPL-SCNC: 3.9 MMOL/L (ref 3.4–5.3)
PROT SERPL-MCNC: 7.1 G/DL (ref 6.8–8.8)
SODIUM SERPL-SCNC: 140 MMOL/L (ref 133–144)
TRIGL SERPL-MCNC: 168 MG/DL

## 2020-10-19 ENCOUNTER — NURSE TRIAGE (OUTPATIENT)
Dept: NURSING | Facility: CLINIC | Age: 80
End: 2020-10-19

## 2020-10-19 NOTE — TELEPHONE ENCOUNTER
Patient leaving the country.  Please address this as soon as possible.    Erika Patrick, [phone], was the caller and patients daughter.  She'd like a call once this has been addressed or if any questions.    Erika is not on the signed Consent to Communicate for from 2018.  She stated she does everything for Petey and should be on it.  I told her Petey can get this form and put her name on it along with his signature.    Erika was relaying a request Petey has for Dr Peres.    Petey is going to [place]. 10/23/20    Requesting the following medications:    Something for diarrhea  Something for malaria  Regular medications      Erika said Petey could be gone as long as three months.      Pharmacy  [facility] in [place] on [address]    COVID 19 Nurse Triage Plan/Patient Instructions    Please be aware that novel coronavirus (COVID-19) may be circulating in the community. If you develop symptoms such as fever, cough, or SOB or if you have concerns about the presence of another infection including coronavirus (COVID-19), please contact your health care provider or visit [email or web address].     Disposition/Instructions    Home care recommended. Follow home care protocol based instructions.    Thank you for taking steps to prevent the spread of this virus.  o Limit your contact with others.  o Wear a simple mask to cover your cough.  o Wash your hands well and often.    Resources    [facility]: About COVID-19: [email or web address]    CDC: What to Do If You're Sick: www.cdc.gov/coronavirus/2019-ncov/about/steps-when-sick.html    CDC: Ending Home Isolation: www.cdc.gov/coronavirus/2019-ncov/hcp/disposition-in-home-patients.html     CDC: Caring for Someone: www.cdc.gov/coronavirus/2019-ncov/if-you-are-sick/care-for-someone.html     [facility]: Interim Guidance for Hospital Discharge to Home: www.health.[facility].[place]/diseases/coronavirus/hcp/hospdischarge.pdf    [facility]  trials (COVID-19 research studies): clinicalaffairs.[facility]/n-clinical-trials     Below are the COVID-19 hotlines at the [place] Department of Health ([facility]). Interpreters are available.   o For health questions: Call 194-517-3626 or 1-789.907.6955 (7 a.m. to 7 p.m.)  o For questions about schools and childcare: Call 418-108-9232 or 1-380.993.6791 (7 a.m. to 7 p.m.)     Additional Information    Negative: MORE THAN A DOUBLE DOSE of a prescription or over-the-counter (OTC) drug    Negative: DOUBLE DOSE (an extra dose or lesser amount) of over-the-counter (OTC) drug and any symptoms (e.g., dizziness, nausea, pain, sleepiness)    Negative: DOUBLE DOSE (an extra dose or lesser amount) of prescription drug and any symptoms (e.g., dizziness, nausea, pain, sleepiness)    Negative: Took another person's prescription drug    Negative: DOUBLE DOSE (an extra dose or lesser amount) of prescription drug and NO symptoms (Exception: a double dose of antibiotics)    Negative: Diabetes drug error or overdose (e.g., took wrong type of insulin or took extra dose)    Negative: Caller has medication question about med not prescribed by PCP and triager unable to answer question (e.g., compatibility with other med, storage)    Request for URGENT new prescription or refill of 'essential' medication (i.e., likelihood of harm to patient if not taken) and triager unable to fill per department policy    Protocols used: MEDICATION QUESTION CALL-A-OH    Routed:  High priority P [phone] BX Dr Larisa SUBRAMANIAN, RN [facility]

## 2020-10-20 ENCOUNTER — VIRTUAL VISIT (OUTPATIENT)
Dept: FAMILY MEDICINE | Facility: CLINIC | Age: 80
End: 2020-10-20
Payer: MEDICARE

## 2020-10-20 DIAGNOSIS — G62.9 PERIPHERAL POLYNEUROPATHY: ICD-10-CM

## 2020-10-20 DIAGNOSIS — I10 BENIGN ESSENTIAL HYPERTENSION: ICD-10-CM

## 2020-10-20 DIAGNOSIS — I10 ESSENTIAL HYPERTENSION WITH GOAL BLOOD PRESSURE LESS THAN 140/90: ICD-10-CM

## 2020-10-20 DIAGNOSIS — E78.5 HYPERLIPIDEMIA LDL GOAL <100: ICD-10-CM

## 2020-10-20 DIAGNOSIS — Z29.89 NEED FOR MALARIA PROPHYLAXIS: ICD-10-CM

## 2020-10-20 DIAGNOSIS — A09 TRAVELER'S DIARRHEA: Primary | ICD-10-CM

## 2020-10-20 PROCEDURE — 99214 OFFICE O/P EST MOD 30 MIN: CPT | Mod: 95 | Performed by: FAMILY MEDICINE

## 2020-10-20 RX ORDER — MEFLOQUINE HYDROCHLORIDE 250 MG/1
TABLET ORAL
Qty: 17 TABLET | Refills: 0 | Status: SHIPPED | OUTPATIENT
Start: 2020-10-20

## 2020-10-20 RX ORDER — ATENOLOL 50 MG/1
50 TABLET ORAL DAILY
Qty: 90 TABLET | Refills: 0 | Status: SHIPPED | OUTPATIENT
Start: 2020-10-20

## 2020-10-20 RX ORDER — ATORVASTATIN CALCIUM 10 MG/1
10 TABLET, FILM COATED ORAL DAILY
Qty: 90 TABLET | Refills: 0 | Status: SHIPPED | OUTPATIENT
Start: 2020-10-20

## 2020-10-20 RX ORDER — AMLODIPINE BESYLATE 5 MG/1
5 TABLET ORAL AT BEDTIME
Qty: 90 TABLET | Refills: 0 | Status: SHIPPED | OUTPATIENT
Start: 2020-10-20

## 2020-10-20 RX ORDER — LOSARTAN POTASSIUM 50 MG/1
50 TABLET ORAL AT BEDTIME
Qty: 90 TABLET | Refills: 0 | Status: SHIPPED | OUTPATIENT
Start: 2020-10-20

## 2020-10-20 RX ORDER — AZITHROMYCIN 500 MG/1
1000 TABLET, FILM COATED ORAL ONCE
Qty: 2 TABLET | Refills: 0 | Status: SHIPPED | OUTPATIENT
Start: 2020-10-20 | End: 2020-10-20

## 2020-10-20 NOTE — PROGRESS NOTES
"Petey Levy is a 80 year old male who is being evaluated via a billable video visit.      The patient has been notified of following:     \"This video visit will be conducted via a call between you and your physician/provider. We have found that certain health care needs can be provided without the need for an in-person physical exam.  This service lets us provide the care you need with a video conversation.  If a prescription is necessary we can send it directly to your pharmacy.  If lab work is needed we can place an order for that and you can then stop by our lab to have the test done at a later time.    Video visits are billed at different rates depending on your insurance coverage.  Please reach out to your insurance provider with any questions.    If during the course of the call the physician/provider feels a video visit is not appropriate, you will not be charged for this service.\"    Patient has given verbal consent for Video visit? Yes  How would you like to obtain your AVS? Mail a copy  If you are dropped from the video visit, the video invite should be resent to: Text to cell phone: [phone]   Will anyone else be joining your video visit? Yes: Erika-daughter. How would they like to receive their invitation? Text to cell phone: [phone]    Subjective     Petey Levy is a 80 year old male who presents today via video visit for the following health issues:    HPI     Concern - Travel Meds/Vaccines  Onset: pt is traveling to [place] [facility], leaving on Friday.   Description: na  Intensity: na  Progression of Symptoms:  na  Accompanying Signs & Symptoms: na  Previous history of similar problem: na  Precipitating factors:        Worsened by: na  Alleviating factors:        Improved by: na  Therapies tried and outcome:  none     Will be traveling to [facility] for the next 3 months; needs anti-diarrheal medication and malaria prophylaxis.  Last traveled to these areas 1 yr ago. Needs to " go there due to medical emergency; family member is ill and dying.     Also needs 90 day supply of his BP and HLD medications.      Video Start Time: 09:55 AM        Review of Systems   CONSTITUTIONAL: NEGATIVE for fever, chills, change in weight  INTEGUMENTARY/SKIN: NEGATIVE for worrisome rashes, moles or lesions  EYES: NEGATIVE for vision changes or irritation  ENT/MOUTH: NEGATIVE for ear, mouth and throat problems  RESP: NEGATIVE for significant cough or SOB  CV: NEGATIVE for chest pain, palpitations or peripheral edema  GI: NEGATIVE for nausea, abdominal pain, heartburn, or change in bowel habits  : NEGATIVE for frequency, dysuria, or hematuria  MUSCULOSKELETAL: NEGATIVE for significant arthralgias or myalgia  NEURO: NEGATIVE for weakness, dizziness or paresthesias  HEME: NEGATIVE for bleeding problems  PSYCHIATRIC: NEGATIVE for changes in mood or affect      Objective       Vitals:  No vitals were obtained today due to virtual visit.    Physical Exam     GENERAL: Healthy, alert and no distress  EYES: Eyes grossly normal to inspection.  No discharge or erythema, or obvious scleral/conjunctival abnormalities.  RESP: No audible wheeze, cough, or visible cyanosis.  No visible retractions or increased work of breathing.    SKIN: Visible skin clear. No significant rash, abnormal pigmentation or lesions.  NEURO: Cranial nerves grossly intact.  Mentation and speech appropriate for age.  PSYCH: Mentation appears normal, affect normal/bright, judgement and insight intact, normal speech and appearance well-groomed.      No results found for this or any previous visit (from the past 24 hour(s)).        Assessment & Plan   Problem List Items Addressed This Visit     Benign essential hypertension    Relevant Medications    losartan (COZAAR) 50 MG tablet    amLODIPine (NORVASC) 5 MG tablet      Other Visit Diagnoses     Traveler's diarrhea    -  Primary    Relevant Medications    azithromycin (ZITHROMAX) 500 MG tablet     Peripheral polyneuropathy        Essential hypertension with goal blood pressure less than 140/90        Relevant Medications    losartan (COZAAR) 50 MG tablet    atenolol (TENORMIN) 50 MG tablet    Hyperlipidemia LDL goal <100        Relevant Medications    atorvastatin (LIPITOR) 10 MG tablet    Need for malaria prophylaxis        Relevant Medications    mefloquine (LARIAM) 250 MG tablet         HTN and HLD:  --Refilled meds for 90 day supply; hopefully he can refill early to have enough meds for his 90 day trip    Traveler's Diarrhea and Malaria prophylaxis:  --Checked CDC and up to date for guidance and discussed this with the patient  --Due to resistance, will send Rx Azithromycin and Rx Lariam         See Patient Instructions  Return in about 3 months (around 1/20/2021) for re-check / follow-up.    Vita Morton DO  [facility]      Video-Visit Details    Type of service:  Video Visit    Video End Time:10:19 AM    Originating Location (pt. Location): Home    Distant Location (provider location):  [facility]     Platform used for Video Visit: Luly

## 2020-10-29 ENCOUNTER — PATIENT OUTREACH (OUTPATIENT)
Dept: GERIATRIC MEDICINE | Facility: CLINIC | Age: 80
End: 2020-10-29

## 2020-10-29 NOTE — LETTER
February 4, 2021    PETEY DELGADO  [address]   [facility] [address]    Dear Petey:     Your Care Coordinator has been unable to reach you by telephone. I am writing to ask you or a family member to call me at [phone]. If you reach my voicemail, please leave a message with your daytime telephone number and a date and time that I can call you. If you are hearing impaired, please call the [place] Relay at [phone] or [phone] (nqnids-ci-kszorc relay service).     The reason I am trying to reach you is:     [] Six (6) month check-in  [x] To schedule your annual assessment  [] Other:      Please call me as soon as you receive this letter. I look forward to speaking with you.    Sincerely,    Rafiq Davila RN, PHN    E-mail: [email or web address]  Phone: [phone]      [facility]+ [phone] [place] ([phone])    [id number] (11/18)

## 2020-10-29 NOTE — LETTER
November 2, 2020    PETEY DELGADO  [address]   [place] [place] [facility] [address]    Dear Petey:     Your Care Coordinator has been unable to reach you by telephone. I am writing to ask you or a family member to call me at [phone]. If you reach my voicemail, please leave a message with your daytime telephone number and a date and time that I can call you. If you are hearing impaired, please call the [place] Relay at [phone] or [phone] (uimfsx-ji-rukldl relay service).     The reason I am trying to reach you is:     [x] Six (6) month check-in  [] To schedule your annual assessment  [] Other:      Please call me as soon as you receive this letter. I look forward to speaking with you.    Sincerely,    Rafiq Davila RN, PHN    E-mail: [email or web address]  Phone: [phone]      [facility]+ [phone] [place] ([phone])    [id number] (11/18)

## 2020-10-30 NOTE — PROGRESS NOTES
[facility] Care Coordination Contact    Unable to complete six month telephone assessment due to member being out of country per family. Family report client will be out of country for the next 3 months.     Rafiq Davila RN BSN PHN  [facility] Care Coordinator  [phone]  Fax:[phone]

## 2020-11-02 NOTE — PROGRESS NOTES
"[facility] Care Coordination Contact    Per CC, mailed client an \"Unable to Contact\" letter.  Helen Hernandez  Case Management Specialist  [facility]  [phone]    "

## 2020-12-13 NOTE — TELEPHONE ENCOUNTER
Last virtual visit, 06/05/2020.      Controlled Substance Refill Request for Gabapentin   Problem List Complete:  No     gabapentin (NEURONTIN) 300 MG capsule  90 capsule  0  5/26/2020          [place]: 5/19/20 no concerns       [email or web address]       checked in past 3 months?  Yes 05/19/2020        
Pharm is requesting a 90 day supply.  
,DirectAddress_Unknown

## 2021-02-04 NOTE — PROGRESS NOTES
"[facility] Care Coordination Contact    Per CC, mailed client an \"Unable to Contact\" letter.  Helen Hernandez  Case Management Specialist  [facility]  [phone]    "

## 2021-07-05 NOTE — PATIENT INSTRUCTIONS
"   SLEEP HYGEINE    Fix a bedtime and an awakening time. Do not be one of .    Avoid napping during the day. \    Avoid alcohol 4-6 hours before bedtime.     Avoid caffeine 4-6 hours before bedtime.     Avoid heavy, spicy, or sugary foods 4-6 hours before bedtime.    Exercise regularly, but not right before bed. \     .   Your Sleeping Environment    Use comfortable bedding.     Find a comfortable temperature setting for sleeping and keep the room well ventilated.\    Block out all distracting noise, Reserve the bed for sleep and sex.   Getting Ready For Bed    Try a light snack before bed. Warm milk and foods high in the amino acid tryptophan, such as bananas, may help you to sleep.    Practice relaxation techniques before bed. 888 breather.    Breathe in for 8 hold it for 8 and breathe out for 8  Think of something pleasant    Don't take your worries to bed.\    Establish a pre-sleep ritual. \    p Get into your favorite sleeping position. \  Getting Up in the Middle of the Night  Most people wake up one or two times a night for various reasons. If you find that you get up in the middle of night and cannot get back to sleep within 15-20 minutes, then do not remain in the bed \"trying hard\" to sleep. Get out of bed. Leave the bedroom. Read, have a light snack, do some quiet activity, or take a bath. You will generally find that you can get back to sleep 20 minutes or so later. Do not perform challenging or engaging activity such as office work, housework, etc. Do not watch television.  A Word About Television  Avoid in bedroom while sleeping.   Other Factors    Several physical factors are known to upset sleep. T\    Psychological and mental health problems like depression, anxiety and stress are often associated with sleeping difficulty.     Many medications can cause sleeplessness as a side effect.     To help overall improvement in sleep patterns, your doctor may prescribe sleep medications for short-term relief " of a sleep problem.     Always follow the advice of your physician and other healthcare professionals.        COUNT YOUR BLESSINGS AT LEAST 5 PER NIGHT MEDITATE ON THEM PRIOR TO BEDTIME    '    '(G47.00) Insomnia, unspecified type    Comment:      Plan: melatonin 3 MG tablet, QUEtiapine (SEROQUEL) 25          MG tablet                   (K21.9) Gastroesophageal reflux disease without esophagitis    Comment:      Plan: omeprazole 20 MG tablet                   (I10) Essential hypertension with goal blood pressure less than 140/90    Comment:      Plan: atenolol (TENORMIN) 50 MG tablet, aspirin 81 MG          tablet, hydrochlorothiazide 12.5 MG TABS           tablet, DISCONTINUED: hydrochlorothiazide 12.5           MG TABS tablet                   (G62.9) Peripheral polyneuropathy    Comment:      Plan: gabapentin (NEURONTIN) 100 MG capsule                   (E55.9) Vitamin D deficiency disease    Comment:      Plan: cholecalciferol (VITAMIN D3) 1000 UNIT tablet                                    Patient

## 2021-09-23 ENCOUNTER — PATIENT OUTREACH (OUTPATIENT)
Dept: GERIATRIC MEDICINE | Facility: CLINIC | Age: 81
End: 2021-09-23

## 2021-09-23 NOTE — LETTER
September 23, 2021    PETEY DELGADO  [address]   [place] [address]    Dear Petey:     Your Care Coordinator has been unable to reach you by telephone.I am writing to ask you or an authorized representative to call me at [phone]. If you reach my voicemail, please leave a message with your daytime telephone number and a date and time that I can call you. If you are hearing impaired, please call the [place] Relay at [phone] or [phone] (ghjxcs-qd-hsgzyz relay service).     The reason I am trying to reach you is:     [x] Six (6) month check-in  [] To schedule your annual assessment  [] Other:      Please call me as soon as you receive this letter. I look forward to speaking with you.    Sincerely,    Rafiq Davila RN, PHN    E-mail: [email or web address]  Phone: [phone]      [facility]+ [id number] DHS Approved([id number])    [id number] (2/19)

## 2021-09-23 NOTE — PROGRESS NOTES
[facility] Care Coordination Contact    Unable to contact member for six month telephone assessment. Eliz Novoa (cousin) reports member is still in [place] and doesn't know when he will return. Notified CMS to send unable to contact letter.     Rafiq DavilaRN BSN PHN  [facility] Care Coordinator  [phone]  Fax:[phone]

## 2021-09-23 NOTE — PROGRESS NOTES
"[facility] Care Coordination Contact    Per CC, mailed client an \"Unable to Contact\" letter.  Helen Hernandez  Case Management Specialist  [facility]  [phone]    "

## 2021-11-20 ENCOUNTER — HEALTH MAINTENANCE LETTER (OUTPATIENT)
Age: 81
End: 2021-11-20

## 2022-01-05 ENCOUNTER — PATIENT OUTREACH (OUTPATIENT)
Dept: GERIATRIC MEDICINE | Facility: CLINIC | Age: 82
End: 2022-01-05
Payer: MEDICARE

## 2022-01-05 NOTE — LETTER
January 10, 2022    PETEY DELGADO  [address]   [place] [address]    Dear Petey:     Your Care Coordinator has been unable to reach you by telephone.I am writing to ask you or an authorized representative to call me at [phone]. If you reach my voicemail, please leave a message with your daytime telephone number and a date and time that I can call you. If you are hearing impaired, please call the [place] Relay at [phone] or [phone] (utgucb-xc-kcssgv relay service).     The reason I am trying to reach you is:     [] Six (6) month check-in  [x] To schedule your annual assessment  [] Other:      Please call me as soon as you receive this letter. I look forward to speaking with you.    Sincerely,    Rafiq Davila RN, PHN    E-mail: [email or web address]  Phone: [phone]      [facility]+ [id number] DHS Approved([id number])    [id number] (2/19)

## 2022-01-05 NOTE — PROGRESS NOTES
[facility] Care Coordination Contact    Spoke with son Dedrick Avila regarding client's FADUMO status. Son shared that client is still out of country and no known return date.    Rafiq Davila RN BSN PHN  [facility] Care Coordinator  [phone]  Fax:[phone]

## 2022-01-10 NOTE — PROGRESS NOTES
"[facility] Care Coordination Contact    Per CC, mailed client an \"Unable to Contact\" letter.    Helen Hernandez  Case Management Specialist  [facility]  [phone]    "
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
## 2022-08-18 ENCOUNTER — PATIENT OUTREACH (OUTPATIENT)
Dept: GERIATRIC MEDICINE | Facility: CLINIC | Age: 82
End: 2022-08-18

## 2022-08-18 NOTE — PROGRESS NOTES
No member call. [facility] Regulatory Update.    Rafiq DavilaRN BSN PHN  [facility] Coordinator  [phone]  Fax:[phone]

## 2022-08-24 ENCOUNTER — PATIENT OUTREACH (OUTPATIENT)
Dept: GERIATRIC MEDICINE | Facility: CLINIC | Age: 82
End: 2022-08-24

## 2022-08-24 NOTE — LETTER
August 25, 2022    PETEY DELGADO  [address]   [place] [place] [facility] [address]    Dear Petey:     Your Care Coordinator has been unable to reach you by telephone.I am writing to ask you or an authorized representative to call me at [phone]. If you reach my voicemail, please leave a message with your daytime telephone number and a date and time that I can call you. If you are hearing impaired, please call the [place] Relay at [phone] or [phone] (limtde-hx-vlpzbs relay service).     The reason I am trying to reach you is:     [x] Six (6) month check-in  [] To schedule your annual assessment  [] Other:      Please call me as soon as you receive this letter. I look forward to speaking with you.    Sincerely,    Rafiq Davila RN, PHN    E-mail: [email or web address]  Phone: [phone]      [facility]+ [id number] DHS Approved([id number])    [id number] (2/19)

## 2022-08-25 NOTE — PROGRESS NOTES
"[facility] Care Coordination Contact    Per CC, mailed client an \"Unable to Contact\" letter.    Joie Monroy  Care Management Specialist  [facility]  [phone]  '  "

## 2022-08-25 NOTE — PROGRESS NOTES
Call placed to client to complete six month telephone assessment, but number not working. Spoke with Annamaria Novoa-causing/emergency contact and she shared member permanently moved back to [place]. Will send [phone] to the [facility] as MA is still active. Notified CMS  to send [facility] letter.     Rafiq DavilaRN BSN PHN  [facility] Care Coordinator  [phone]  Fax:[phone]

## 2022-11-20 ENCOUNTER — PATIENT OUTREACH (OUTPATIENT)
Dept: GERIATRIC MEDICINE | Facility: CLINIC | Age: 82
End: 2022-11-20

## 2022-11-20 NOTE — PROGRESS NOTES
Encounter opened due to Regulatory [employer] Update to open FVP Program.    Joie Monroy  Care Management Specialist  [facility]  [phone]

## 2022-11-20 NOTE — PROGRESS NOTES
Encounter opened due to Regulatory [employer] Update to close FVP Program.    Joie Monroy  Care Management Specialist  [facility]  [phone]

## 2022-12-13 ENCOUNTER — PATIENT OUTREACH (OUTPATIENT)
Dept: GERIATRIC MEDICINE | Facility: CLINIC | Age: 82
End: 2022-12-13

## 2022-12-13 NOTE — PROGRESS NOTES
[facility] Care Coordination Contact    Member remains out of country per family. [place] still active. [place].   Completed MMIS entry.  Completed health plan required UTC POC.    Follow-up Plan: CC will attempt to reach member in six months.    This CC note routed to PCP.    Rafiq DavilaRN BSN PHN  [facility] Care Coordinator  [phone]  Fax:[phone]

## 2022-12-13 NOTE — LETTER
December 15, 2022    PETEY DELGADO  [address]   [place] [place] [address]    Dear Petey:     I m your care coordinator. I ve been unable to reach you by phone. I am writing to ask you or your authorized representative to call me at [phone]. If you reach my voicemail, leave a message with your daytime phone number. Include a date and time that I can call you. If you are hearing impaired, call the [place] Relay at [phone] or [phone] (xpvkbc-xg-ovmhqk relay service).    The reason I am trying to reach you is:     [x] To schedule an assessment  [] For your six (6)-month check-in  [] Other:      Please call me as soon as you receive this letter. I look forward to speaking with you.    Sincerely,    Rafiq Davila RN, PHN    E-mail: [email or web address]  Phone: [phone]      [employer]        [id number] accepted  [id number]                                                                        B  (08/2022)

## 2022-12-15 NOTE — PROGRESS NOTES
"[facility] Care Coordination Contact    Per CC, mailed client an \"Unable to Contact\" letter.    Joie Monroy  Care Management Specialist  [facility]  [phone]      "

## 2022-12-26 ENCOUNTER — HEALTH MAINTENANCE LETTER (OUTPATIENT)
Age: 82
End: 2022-12-26

## 2023-03-30 NOTE — ED AVS SNAPSHOT
Emergency Department  [address] [facility] [address]  Phone:  [phone]  Fax:  [phone]                                    Petey Levy   MRN: [id number]    Department:   Emergency Department   Date of Visit:  5/27/2020           After Visit Summary Signature Page    I have received my discharge instructions, and my questions have been answered. I have discussed any challenges I see with this plan with the nurse or doctor.    ..........................................................................................................................................  Patient/Patient Representative Signature      ..........................................................................................................................................  Patient Representative Print Name and Relationship to Patient    ..................................................               ................................................  Date                                   Time    ..........................................................................................................................................  Reviewed by Signature/Title    ...................................................              ..............................................  Date                                               Time          22EPIC Rev 08/18        glasses

## 2023-04-25 ENCOUNTER — PATIENT OUTREACH (OUTPATIENT)
Dept: GERIATRIC MEDICINE | Facility: CLINIC | Age: 83
End: 2023-04-25
Payer: MEDICARE

## 2023-04-25 NOTE — PROGRESS NOTES
[facility] Care Coordination Contact    No longer active with [facility] case management effective 12/31/22.  Reason for community disenrollment: ROSANNA Davila RN BSN PHN  [facility] Care Coordinator  [phone]  Fax:[phone]

## 2024-02-04 ENCOUNTER — HEALTH MAINTENANCE LETTER (OUTPATIENT)
Age: 84
End: 2024-02-04

## 2024-04-09 NOTE — TELEPHONE ENCOUNTER
"Requested Prescriptions   Pending Prescriptions Disp Refills     hydrochlorothiazide 12.5 MG TABS tablet [Pharmacy Med Name: HYDROCHLOROTHIAZIDE 12.5 MG TB]  Last Written Prescription Date:  5/1/18  Last Fill Quantity: 30 and 90,  # refills: 0 and 3   Last office visit: 5/1/2018 with prescribing provider:  Braden   Future Office Visit:        There are 2 Rx's for hydrochlorothiazide written on 5/1/18 by Dr. Feliciano 30 tablet 0     Sig: TAKE 1 TABLET BY MOUTH EVERY DAY    Diuretics (Including Combos) Protocol Failed    5/28/2018  1:17 AM       Failed - Blood pressure under 140/90 in past 12 months    BP Readings from Last 3 Encounters:   05/01/18 144/64   09/19/17 148/84   09/13/17 140/72                Passed - Recent (12 mo) or future (30 days) visit within the authorizing provider's specialty    Patient had office visit in the last 12 months or has a visit in the next 30 days with authorizing provider or within the authorizing provider's specialty.  See \"Patient Info\" tab in inbasket, or \"Choose Columns\" in Meds & Orders section of the refill encounter.           Passed - Patient is age 18 or older       Passed - Normal serum creatinine on file in past 12 months    Recent Labs   Lab Test  09/13/17   1508   CR  0.93             Passed - Normal serum potassium on file in past 12 months    Recent Labs   Lab Test  09/13/17   1508   POTASSIUM  3.6                   Passed - Normal serum sodium on file in past 12 months    Recent Labs   Lab Test  09/13/17   1508   NA  140                " Care Management Initial Consult    General Information  Assessment completed with: Patient,    Type of CM/SW Visit: Initial Assessment    Primary Care Provider verified and updated as needed: Yes   Readmission within the last 30 days:        Reason for Consult: discharge planning  Advance Care Planning: Advance Care Planning Reviewed: verified with patient          Communication Assessment  Patient's communication style: spoken language (English or Bilingual)    Hearing Difficulty or Deaf: yes   Wear Glasses or Blind: yes    Cognitive  Cognitive/Neuro/Behavioral: WDL                      Living Environment:   People in home: alone     Current living Arrangements: apartment      Able to return to prior arrangements: yes       Family/Social Support:  Care provided by: self  Provides care for: no one  Marital Status:  (Nun)  [place] Nurse, Neighbor          Description of Support System: Supportive    Support Assessment: Adequate family and caregiver support    Current Resources:   Patient receiving home care services: No     Community Resources: None  Equipment currently used at home: walker, rolling  Supplies currently used at home: None    Employment/Financial:  Employment Status: employed full-time        Financial Concerns: none   Referral to Financial Worker: No       Does the patient's insurance plan have a 3 day qualifying hospital stay waiver?  No    Lifestyle & Psychosocial Needs:  Social Determinants of Health     Food Insecurity: Low Risk  (4/5/2024)    Food Insecurity     Within the past 12 months, did you worry that your food would run out before you got money to buy more?: No     Within the past 12 months, did the food you bought just not last and you didn t have money to get more?: No   Depression: Not at risk (4/5/2024)    PHQ-2     PHQ-2 Score: 0   Housing Stability: Low Risk  (4/5/2024)    Housing Stability     Do you have housing? : Yes     Are you worried about losing your housing?: No   Tobacco  Use: Low Risk  (4/5/2024)    Patient History     Smoking Tobacco Use: Never     Smokeless Tobacco Use: Never     Passive Exposure: Never   Financial Resource Strain: Low Risk  (4/5/2024)    Financial Resource Strain     Within the past 12 months, have you or your family members you live with been unable to get utilities (heat, electricity) when it was really needed?: No   Alcohol Use: Not At Risk (3/27/2023)    AUDIT-C     Frequency of Alcohol Consumption: Never     Average Number of Drinks: Patient does not drink     Frequency of Binge Drinking: Never   Transportation Needs: Low Risk  (4/5/2024)    Transportation Needs     Within the past 12 months, has lack of transportation kept you from medical appointments, getting your medicines, non-medical meetings or appointments, work, or from getting things that you need?: No   Physical Activity: Insufficiently Active (3/27/2023)    Exercise Vital Sign     Days of Exercise per Week: 3 days     Minutes of Exercise per Session: 10 min   Interpersonal Safety: Low Risk  (4/5/2024)    Interpersonal Safety     Do you feel physically and emotionally safe where you currently live?: Yes     Within the past 12 months, have you been hit, slapped, kicked or otherwise physically hurt by someone?: No     Within the past 12 months, have you been humiliated or emotionally abused in other ways by your partner or ex-partner?: No   Stress: No Stress Concern Present (4/5/2024)    [employer] [place] [employer] - Occupational Stress Questionnaire     Feeling of Stress : Not at all   Social Connections: Moderately Integrated (3/27/2023)    Social Connection and Isolation Panel [NHANES]     Frequency of Communication with Friends and Family: More than three times a week     Frequency of Social Gatherings with Friends and Family: More than three times a week     Attends [employer] Services: More than 4 times per year     Active Member of Clubs or Organizations: Yes     Attends Club or  Organization Meetings: More than 4 times per year     Marital Status: Never        Functional Status:  Prior to admission patient needed assistance:   Dependent ADLs:: Ambulation-walker, Bathing  Dependent IADLs:: Shopping, Transportation  Assesssment of Functional Status: At functional baseline    Mental Health Status:  Mental Health Status: No Current Concerns       Chemical Dependency Status:  Chemical Dependency Status: No Current Concerns             Values/Beliefs:  Spiritual, Cultural Beliefs, Caodaism Practices, Values that affect care: yes       Cultural/Caodaism Practices Patient Routinely Participates In: prayer       Additional Information:  SW met with pt to introduce role of CM, complete  initial assessment, and to discuss needs at time of d/c. Pt comes from home; lives with other Nun's in the [place] apartments, and noted to be independent at baseline with walker. Pt feels that there will be no needs from CM at discharge as she is not receiving services at this time, and will follow with PCP if needs arise post discharge.Sister Nikky to transport at discharge and care management will assist with arranging.    CM to continue to follow through hospitalization.  9:01 AM    Brenna Kjellberg, BSW LSW  4/9/2024

## 2025-03-02 ENCOUNTER — HEALTH MAINTENANCE LETTER (OUTPATIENT)
Age: 85
End: 2025-03-02